# Patient Record
Sex: MALE | Race: WHITE | NOT HISPANIC OR LATINO | URBAN - METROPOLITAN AREA
[De-identification: names, ages, dates, MRNs, and addresses within clinical notes are randomized per-mention and may not be internally consistent; named-entity substitution may affect disease eponyms.]

---

## 2018-09-27 ENCOUNTER — INPATIENT HOSPITAL (AMBULATORY)
Dept: URBAN - METROPOLITAN AREA HOSPITAL 84 | Facility: HOSPITAL | Age: 73
End: 2018-09-27

## 2018-09-27 DIAGNOSIS — K59.00 CONSTIPATION, UNSPECIFIED: ICD-10-CM

## 2018-09-27 DIAGNOSIS — R14.0 ABDOMINAL DISTENSION (GASEOUS): ICD-10-CM

## 2018-09-27 DIAGNOSIS — D61.818 OTHER PANCYTOPENIA: ICD-10-CM

## 2018-09-27 DIAGNOSIS — D51.9 VITAMIN B12 DEFICIENCY ANEMIA, UNSPECIFIED: ICD-10-CM

## 2018-09-27 PROCEDURE — 99222 1ST HOSP IP/OBS MODERATE 55: CPT | Performed by: NURSE PRACTITIONER

## 2018-09-28 ENCOUNTER — INPATIENT HOSPITAL (AMBULATORY)
Dept: URBAN - METROPOLITAN AREA HOSPITAL 84 | Facility: HOSPITAL | Age: 73
End: 2018-09-28

## 2018-09-28 DIAGNOSIS — I10 ESSENTIAL (PRIMARY) HYPERTENSION: ICD-10-CM

## 2018-09-28 DIAGNOSIS — K59.00 CONSTIPATION, UNSPECIFIED: ICD-10-CM

## 2018-09-28 DIAGNOSIS — D61.818 OTHER PANCYTOPENIA: ICD-10-CM

## 2018-09-28 DIAGNOSIS — D64.9 ANEMIA, UNSPECIFIED: ICD-10-CM

## 2018-09-28 DIAGNOSIS — R14.0 ABDOMINAL DISTENSION (GASEOUS): ICD-10-CM

## 2018-09-28 PROCEDURE — 99231 SBSQ HOSP IP/OBS SF/LOW 25: CPT | Performed by: NURSE PRACTITIONER

## 2018-09-29 ENCOUNTER — INPATIENT HOSPITAL (AMBULATORY)
Dept: URBAN - METROPOLITAN AREA HOSPITAL 84 | Facility: HOSPITAL | Age: 73
End: 2018-09-29

## 2018-09-29 DIAGNOSIS — K29.70 GASTRITIS, UNSPECIFIED, WITHOUT BLEEDING: ICD-10-CM

## 2018-09-29 DIAGNOSIS — D64.9 ANEMIA, UNSPECIFIED: ICD-10-CM

## 2018-09-29 DIAGNOSIS — K31.7 POLYP OF STOMACH AND DUODENUM: ICD-10-CM

## 2018-09-29 PROCEDURE — 43239 EGD BIOPSY SINGLE/MULTIPLE: CPT | Performed by: INTERNAL MEDICINE

## 2018-10-05 ENCOUNTER — HOSPITAL ENCOUNTER (OUTPATIENT)
Dept: ONCOLOGY | Facility: CLINIC | Age: 73
Setting detail: INFUSION SERIES
Discharge: HOME | End: 2018-10-05
Attending: INTERNAL MEDICINE | Admitting: INTERNAL MEDICINE

## 2018-10-05 ENCOUNTER — CLINICAL SUPPORT (OUTPATIENT)
Dept: ONCOLOGY | Facility: HOSPITAL | Age: 73
End: 2018-10-05

## 2018-10-05 NOTE — PROGRESS NOTES
PATIENTS ONCOLOGY RECORD LOCATED IN [facility]      Subjective     Name:  BASILIO ABREU     Date:  10/05/2018  Address:  [address] [place] [address]  Home: [phone]  :  1945 AGE:  73 y.o.        RECORDS OBTAINED:  Patients Oncology Record is located in [facility]

## 2018-10-29 ENCOUNTER — HOSPITAL ENCOUNTER (OUTPATIENT)
Dept: ONCOLOGY | Facility: CLINIC | Age: 73
Setting detail: INFUSION SERIES
Discharge: HOME | End: 2018-10-29
Attending: INTERNAL MEDICINE | Admitting: INTERNAL MEDICINE

## 2018-10-29 ENCOUNTER — CLINICAL SUPPORT (OUTPATIENT)
Dept: ONCOLOGY | Facility: HOSPITAL | Age: 73
End: 2018-10-29

## 2018-10-29 NOTE — PROGRESS NOTES
PATIENTS ONCOLOGY RECORD LOCATED IN [facility]      Subjective     Name:  BASILIO ABREU     Date:  10/29/2018  Address:  [address] [place] [address]  Home: [phone]  :  1945 AGE:  73 y.o.        RECORDS OBTAINED:  Patients Oncology Record is located in [facility]

## 2018-11-06 ENCOUNTER — HOSPITAL ENCOUNTER (OUTPATIENT)
Dept: CARDIOLOGY | Facility: HOSPITAL | Age: 73
Discharge: HOME | End: 2018-11-06
Attending: INTERNAL MEDICINE | Admitting: INTERNAL MEDICINE

## 2018-11-15 ENCOUNTER — HOSPITAL ENCOUNTER (OUTPATIENT)
Dept: ONCOLOGY | Facility: CLINIC | Age: 73
Setting detail: INFUSION SERIES
Discharge: HOME | End: 2018-11-15
Attending: INTERNAL MEDICINE | Admitting: INTERNAL MEDICINE

## 2018-11-15 ENCOUNTER — CLINICAL SUPPORT (OUTPATIENT)
Dept: ONCOLOGY | Facility: HOSPITAL | Age: 73
End: 2018-11-15

## 2018-11-15 NOTE — PROGRESS NOTES
PATIENTS ONCOLOGY RECORD LOCATED IN [facility]      Subjective     Name:  BASILIO ABREU     Date:  11/15/2018  Address:  [address] [place] [address]  Home: [unfilled]  :  1945 AGE:  73 y.o.        RECORDS OBTAINED:  Patients Oncology Record is located in [facility]

## 2018-11-29 ENCOUNTER — CLINICAL SUPPORT (OUTPATIENT)
Dept: ONCOLOGY | Facility: HOSPITAL | Age: 73
End: 2018-11-29

## 2018-11-29 ENCOUNTER — HOSPITAL ENCOUNTER (OUTPATIENT)
Dept: ONCOLOGY | Facility: CLINIC | Age: 73
Setting detail: INFUSION SERIES
Discharge: HOME | End: 2018-11-29
Attending: INTERNAL MEDICINE | Admitting: INTERNAL MEDICINE

## 2018-11-29 NOTE — PROGRESS NOTES
PATIENTS ONCOLOGY RECORD LOCATED IN [facility]      Subjective     Name:  BASILIO ABREU     Date:  2018  Address:  [address] [place] [address]  Home: [unfilled]  :  1945 AGE:  73 y.o.        RECORDS OBTAINED:  Patients Oncology Record is located in [facility]

## 2018-12-27 ENCOUNTER — HOSPITAL ENCOUNTER (OUTPATIENT)
Dept: ONCOLOGY | Facility: CLINIC | Age: 73
Setting detail: INFUSION SERIES
Discharge: HOME | End: 2018-12-27
Attending: INTERNAL MEDICINE | Admitting: INTERNAL MEDICINE

## 2018-12-27 ENCOUNTER — CLINICAL SUPPORT (OUTPATIENT)
Dept: ONCOLOGY | Facility: HOSPITAL | Age: 73
End: 2018-12-27

## 2018-12-27 NOTE — PROGRESS NOTES
PATIENTS ONCOLOGY RECORD LOCATED IN [facility]      Subjective     Name:  BASILIO ABREU     Date:  2018  Address:  [address] [place] [address]  Home: [unfilled]  :  1945 AGE:  73 y.o.        RECORDS OBTAINED:  Patients Oncology Record is located in [facility]

## 2019-01-16 ENCOUNTER — HOSPITAL ENCOUNTER (OUTPATIENT)
Dept: LAB | Facility: HOSPITAL | Age: 74
Discharge: HOME | End: 2019-01-16
Attending: INTERNAL MEDICINE | Admitting: INTERNAL MEDICINE

## 2019-01-16 LAB
ANION GAP SERPL CALC-SCNC: 12.1 MMOL/L (ref 10–20)
BUN SERPL-MCNC: 19 MG/DL (ref 8–20)
BUN/CREAT SERPL: 19 (ref 6.2–20.3)
CALCIUM SERPL-MCNC: 9 MG/DL (ref 8.9–10.3)
CHLORIDE SERPL-SCNC: 106 MMOL/L (ref 101–111)
CONV CO2: 24 MMOL/L (ref 22–32)
CREAT UR-MCNC: 1 MG/DL (ref 0.7–1.2)
GLUCOSE SERPL-MCNC: 196 MG/DL (ref 65–99)
POTASSIUM SERPL-SCNC: 4.1 MMOL/L (ref 3.6–5.1)
SODIUM SERPL-SCNC: 138 MMOL/L (ref 136–144)

## 2019-01-31 ENCOUNTER — HOSPITAL ENCOUNTER (OUTPATIENT)
Dept: ONCOLOGY | Facility: CLINIC | Age: 74
Setting detail: INFUSION SERIES
Discharge: HOME | End: 2019-01-31
Attending: INTERNAL MEDICINE | Admitting: INTERNAL MEDICINE

## 2019-01-31 ENCOUNTER — CLINICAL SUPPORT (OUTPATIENT)
Dept: ONCOLOGY | Facility: HOSPITAL | Age: 74
End: 2019-01-31

## 2019-01-31 NOTE — PROGRESS NOTES
PATIENTS ONCOLOGY RECORD LOCATED IN [facility]      Subjective     Name:  BASILIO ABREU     Date:  2019  Address:  [address] [place] [address]  Home: [unfilled]  :  1945 AGE:  74 y.o.        RECORDS OBTAINED:  Patients Oncology Record is located in [facility]

## 2019-02-28 ENCOUNTER — HOSPITAL ENCOUNTER (OUTPATIENT)
Dept: ONCOLOGY | Facility: CLINIC | Age: 74
Setting detail: INFUSION SERIES
Discharge: HOME | End: 2019-02-28
Attending: INTERNAL MEDICINE | Admitting: INTERNAL MEDICINE

## 2019-02-28 ENCOUNTER — HOSPITAL ENCOUNTER (OUTPATIENT)
Dept: ONCOLOGY | Facility: HOSPITAL | Age: 74
Discharge: HOME | End: 2019-02-28

## 2019-03-28 ENCOUNTER — CLINICAL SUPPORT (OUTPATIENT)
Dept: ONCOLOGY | Facility: HOSPITAL | Age: 74
End: 2019-03-28

## 2019-03-28 ENCOUNTER — HOSPITAL ENCOUNTER (OUTPATIENT)
Dept: ONCOLOGY | Facility: CLINIC | Age: 74
Setting detail: INFUSION SERIES
Discharge: HOME | End: 2019-03-28
Attending: INTERNAL MEDICINE | Admitting: INTERNAL MEDICINE

## 2019-03-28 NOTE — PROGRESS NOTES
PATIENTS ONCOLOGY RECORD LOCATED IN [facility]      Subjective     Name:  BASILIO ABREU     Date:  2019  Address:  [address] [place] [address]  Home: [unfilled]  :  1945 AGE:  74 y.o.        RECORDS OBTAINED:  Patients Oncology Record is located in [facility]

## 2019-04-01 ENCOUNTER — HOSPITAL ENCOUNTER (OUTPATIENT)
Dept: OTHER | Facility: HOSPITAL | Age: 74
Discharge: HOME | End: 2019-04-01
Attending: UROLOGY | Admitting: UROLOGY

## 2019-04-01 LAB
ANION GAP SERPL CALC-SCNC: 16.4 MMOL/L (ref 10–20)
BASOPHILS # BLD AUTO: 0 10*3/UL (ref 0–0.2)
BASOPHILS NFR BLD AUTO: 0 % (ref 0–2)
BUN SERPL-MCNC: 13 MG/DL (ref 8–20)
BUN/CREAT SERPL: 13 (ref 6.2–20.3)
CALCIUM SERPL-MCNC: 8.8 MG/DL (ref 8.9–10.3)
CHLORIDE SERPL-SCNC: 103 MMOL/L (ref 101–111)
CONV CO2: 24 MMOL/L (ref 22–32)
CREAT UR-MCNC: 1 MG/DL (ref 0.7–1.2)
DIFFERENTIAL METHOD BLD: (no result)
EOSINOPHIL # BLD AUTO: 0.2 10*3/UL (ref 0–0.3)
EOSINOPHIL # BLD AUTO: 3 % (ref 0–3)
ERYTHROCYTE [DISTWIDTH] IN BLOOD BY AUTOMATED COUNT: 13.7 % (ref 11.5–14.5)
GLUCOSE SERPL-MCNC: 110 MG/DL (ref 65–99)
HCT VFR BLD AUTO: 45.5 % (ref 40–54)
HGB BLD-MCNC: 15.5 G/DL (ref 14–18)
LYMPHOCYTES # BLD AUTO: 2.7 10*3/UL (ref 0.8–4.8)
LYMPHOCYTES NFR BLD AUTO: 29 % (ref 18–42)
MCH RBC QN AUTO: 31.2 PG (ref 26–32)
MCHC RBC AUTO-ENTMCNC: 34.1 G/DL (ref 32–36)
MCV RBC AUTO: 91.5 FL (ref 80–94)
MONOCYTES # BLD AUTO: 0.6 10*3/UL (ref 0.1–1.3)
MONOCYTES NFR BLD AUTO: 6 % (ref 2–11)
NEUTROPHILS # BLD AUTO: 5.9 10*3/UL (ref 2.3–8.6)
NEUTROPHILS NFR BLD AUTO: 62 % (ref 50–75)
NRBC BLD AUTO-RTO: 0 /100{WBCS}
NRBC/RBC NFR BLD MANUAL: 0 10*3/UL
PLATELET # BLD AUTO: 309 10*3/UL (ref 150–450)
PMV BLD AUTO: 6.7 FL (ref 7.4–10.4)
POTASSIUM SERPL-SCNC: 4.4 MMOL/L (ref 3.6–5.1)
RBC # BLD AUTO: 4.97 10*6/UL (ref 4.6–6)
SODIUM SERPL-SCNC: 139 MMOL/L (ref 136–144)
WBC # BLD AUTO: 9.4 10*3/UL (ref 4.5–11.5)

## 2019-04-25 ENCOUNTER — HOSPITAL ENCOUNTER (OUTPATIENT)
Dept: ONCOLOGY | Facility: CLINIC | Age: 74
Setting detail: INFUSION SERIES
Discharge: HOME | End: 2019-04-25
Attending: INTERNAL MEDICINE | Admitting: INTERNAL MEDICINE

## 2019-04-25 ENCOUNTER — CLINICAL SUPPORT (OUTPATIENT)
Dept: ONCOLOGY | Facility: HOSPITAL | Age: 74
End: 2019-04-25

## 2019-04-25 NOTE — PROGRESS NOTES
PATIENTS ONCOLOGY RECORD LOCATED IN [facility]      Subjective     Name:  BASILIO ABREU     Date:  2019  Address:  [address] [place] [address]  Home: [unfilled]  :  1945 AGE:  74 y.o.        RECORDS OBTAINED:  Patients Oncology Record is located in [facility]

## 2019-05-21 VITALS — HEIGHT: 68 IN | WEIGHT: 196.6 LBS | BODY MASS INDEX: 29.8 KG/M2

## 2019-05-23 ENCOUNTER — HOSPITAL ENCOUNTER (OUTPATIENT)
Dept: ONCOLOGY | Facility: CLINIC | Age: 74
Setting detail: INFUSION SERIES
Discharge: HOME | End: 2019-05-23
Attending: INTERNAL MEDICINE | Admitting: INTERNAL MEDICINE

## 2019-05-23 ENCOUNTER — CLINICAL SUPPORT (OUTPATIENT)
Dept: ONCOLOGY | Facility: HOSPITAL | Age: 74
End: 2019-05-23

## 2019-05-23 NOTE — PROGRESS NOTES
PATIENTS ONCOLOGY RECORD LOCATED IN [facility]      Subjective     Name:  BASILIO ABREU     Date:  2019  Address:  [address] [place] [address]  Home: [unfilled]  :  1945 AGE:  74 y.o.        RECORDS OBTAINED:  Patients Oncology Record is located in [facility]

## 2019-06-11 DIAGNOSIS — E53.8 B12 DEFICIENCY: ICD-10-CM

## 2019-06-11 RX ORDER — CYANOCOBALAMIN 1000 UG/ML
1000 INJECTION, SOLUTION INTRAMUSCULAR; SUBCUTANEOUS
Status: CANCELLED | OUTPATIENT
Start: 2019-06-20

## 2019-06-20 ENCOUNTER — HOSPITAL ENCOUNTER (OUTPATIENT)
Dept: ONCOLOGY | Facility: HOSPITAL | Age: 74
Discharge: HOME | End: 2019-06-20
Admitting: NURSE PRACTITIONER

## 2019-06-20 VITALS
HEART RATE: 61 BPM | BODY MASS INDEX: 30.4 KG/M2 | TEMPERATURE: 98 F | HEIGHT: 68 IN | SYSTOLIC BLOOD PRESSURE: 130 MMHG | DIASTOLIC BLOOD PRESSURE: 70 MMHG | WEIGHT: 200.6 LBS | RESPIRATION RATE: 18 BRPM

## 2019-06-20 DIAGNOSIS — E53.8 B12 DEFICIENCY: Primary | ICD-10-CM

## 2019-06-20 PROCEDURE — 96372 THER/PROPH/DIAG INJ SC/IM: CPT

## 2019-06-20 PROCEDURE — 25010000002 CYANOCOBALAMIN PER 1000 MCG: Performed by: NURSE PRACTITIONER

## 2019-06-20 RX ORDER — CYANOCOBALAMIN 1000 UG/ML
1000 INJECTION, SOLUTION INTRAMUSCULAR; SUBCUTANEOUS
Status: DISCONTINUED | OUTPATIENT
Start: 2019-06-20 | End: 2019-06-21 | Stop reason: HOSPADM

## 2019-06-20 RX ADMIN — CYANOCOBALAMIN 1000 MCG: 1000 INJECTION, SOLUTION INTRAMUSCULAR; SUBCUTANEOUS at 10:49

## 2019-07-17 DIAGNOSIS — E53.8 B12 DEFICIENCY: ICD-10-CM

## 2019-07-17 RX ORDER — CYANOCOBALAMIN 1000 UG/ML
1000 INJECTION, SOLUTION INTRAMUSCULAR; SUBCUTANEOUS
Status: CANCELLED | OUTPATIENT
Start: 2019-08-22

## 2019-07-17 RX ORDER — CYANOCOBALAMIN 1000 UG/ML
1000 INJECTION, SOLUTION INTRAMUSCULAR; SUBCUTANEOUS
Status: CANCELLED | OUTPATIENT
Start: 2019-09-26

## 2019-07-17 RX ORDER — CYANOCOBALAMIN 1000 UG/ML
1000 INJECTION, SOLUTION INTRAMUSCULAR; SUBCUTANEOUS
Status: CANCELLED | OUTPATIENT
Start: 2019-10-24

## 2019-07-17 RX ORDER — CYANOCOBALAMIN 1000 UG/ML
1000 INJECTION, SOLUTION INTRAMUSCULAR; SUBCUTANEOUS
Status: CANCELLED | OUTPATIENT
Start: 2019-11-21

## 2019-07-17 RX ORDER — CYANOCOBALAMIN 1000 UG/ML
1000 INJECTION, SOLUTION INTRAMUSCULAR; SUBCUTANEOUS
Status: CANCELLED | OUTPATIENT
Start: 2019-07-18

## 2019-07-18 ENCOUNTER — HOSPITAL ENCOUNTER (OUTPATIENT)
Dept: ONCOLOGY | Facility: HOSPITAL | Age: 74
Discharge: HOME | End: 2019-07-18
Admitting: NURSE PRACTITIONER

## 2019-07-18 VITALS
TEMPERATURE: 97.6 F | HEART RATE: 61 BPM | HEIGHT: 68 IN | DIASTOLIC BLOOD PRESSURE: 80 MMHG | SYSTOLIC BLOOD PRESSURE: 142 MMHG | WEIGHT: 198.6 LBS | BODY MASS INDEX: 30.1 KG/M2

## 2019-07-18 DIAGNOSIS — E53.8 B12 DEFICIENCY: Primary | ICD-10-CM

## 2019-07-18 PROCEDURE — 96372 THER/PROPH/DIAG INJ SC/IM: CPT

## 2019-07-18 PROCEDURE — 25010000002 CYANOCOBALAMIN PER 1000 MCG: Performed by: NURSE PRACTITIONER

## 2019-07-18 RX ORDER — CYANOCOBALAMIN 1000 UG/ML
1000 INJECTION, SOLUTION INTRAMUSCULAR; SUBCUTANEOUS
Status: DISCONTINUED | OUTPATIENT
Start: 2019-07-18 | End: 2019-07-19 | Stop reason: HOSPADM

## 2019-07-18 RX ADMIN — CYANOCOBALAMIN 1000 MCG: 1000 INJECTION, SOLUTION INTRAMUSCULAR; SUBCUTANEOUS at 10:30

## 2019-08-22 ENCOUNTER — HOSPITAL ENCOUNTER (OUTPATIENT)
Dept: ONCOLOGY | Facility: HOSPITAL | Age: 74
Discharge: HOME | End: 2019-08-22
Admitting: NURSE PRACTITIONER

## 2019-08-22 VITALS — RESPIRATION RATE: 18 BRPM | TEMPERATURE: 97.5 F | WEIGHT: 199 LBS | BODY MASS INDEX: 30.16 KG/M2 | HEIGHT: 68 IN

## 2019-08-22 DIAGNOSIS — E53.8 B12 DEFICIENCY: Primary | ICD-10-CM

## 2019-08-22 PROCEDURE — 96372 THER/PROPH/DIAG INJ SC/IM: CPT | Performed by: INTERNAL MEDICINE

## 2019-08-22 PROCEDURE — 25010000002 CYANOCOBALAMIN PER 1000 MCG: Performed by: INTERNAL MEDICINE

## 2019-08-22 RX ORDER — CYANOCOBALAMIN 1000 UG/ML
1000 INJECTION, SOLUTION INTRAMUSCULAR; SUBCUTANEOUS
Status: DISCONTINUED | OUTPATIENT
Start: 2019-08-22 | End: 2019-08-23 | Stop reason: HOSPADM

## 2019-08-22 RX ADMIN — CYANOCOBALAMIN 1000 MCG: 1000 INJECTION, SOLUTION INTRAMUSCULAR; SUBCUTANEOUS at 11:33

## 2019-09-26 ENCOUNTER — APPOINTMENT (OUTPATIENT)
Dept: LAB | Facility: HOSPITAL | Age: 74
End: 2019-09-26

## 2019-09-26 ENCOUNTER — HOSPITAL ENCOUNTER (OUTPATIENT)
Dept: ONCOLOGY | Facility: HOSPITAL | Age: 74
Discharge: HOME | End: 2019-09-26
Admitting: NURSE PRACTITIONER

## 2019-09-26 ENCOUNTER — OFFICE VISIT (OUTPATIENT)
Dept: ONCOLOGY | Facility: CLINIC | Age: 74
End: 2019-09-26

## 2019-09-26 VITALS
BODY MASS INDEX: 30.4 KG/M2 | TEMPERATURE: 97.6 F | SYSTOLIC BLOOD PRESSURE: 177 MMHG | HEART RATE: 69 BPM | WEIGHT: 200.6 LBS | RESPIRATION RATE: 20 BRPM | DIASTOLIC BLOOD PRESSURE: 89 MMHG | HEIGHT: 68 IN

## 2019-09-26 DIAGNOSIS — E53.8 B12 DEFICIENCY: Primary | ICD-10-CM

## 2019-09-26 LAB
BASOPHILS # BLD AUTO: 0.01 10*3/MM3 (ref 0–0.2)
BASOPHILS NFR BLD AUTO: 0.1 % (ref 0–1.5)
DEPRECATED RDW RBC AUTO: 41.4 FL (ref 37–54)
EOSINOPHIL # BLD AUTO: 0.14 10*3/MM3 (ref 0–0.4)
EOSINOPHIL NFR BLD AUTO: 2 % (ref 0.3–6.2)
ERYTHROCYTE [DISTWIDTH] IN BLOOD BY AUTOMATED COUNT: 12.9 % (ref 12.3–15.4)
HCT VFR BLD AUTO: 45.2 % (ref 37.5–51)
HGB BLD-MCNC: 15.6 G/DL (ref 13–17.7)
LYMPHOCYTES # BLD AUTO: 1.86 10*3/MM3 (ref 0.7–3.1)
LYMPHOCYTES NFR BLD AUTO: 26.3 % (ref 19.6–45.3)
MCH RBC QN AUTO: 31.1 PG (ref 26.6–33)
MCHC RBC AUTO-ENTMCNC: 34.5 G/DL (ref 31.5–35.7)
MCV RBC AUTO: 90 FL (ref 79–97)
MONOCYTES # BLD AUTO: 0.67 10*3/MM3 (ref 0.1–0.9)
MONOCYTES NFR BLD AUTO: 9.5 % (ref 5–12)
NEUTROPHILS # BLD AUTO: 4.4 10*3/MM3 (ref 1.7–7)
NEUTROPHILS NFR BLD AUTO: 62.1 % (ref 42.7–76)
PLATELET # BLD AUTO: 188 10*3/MM3 (ref 140–450)
PMV BLD AUTO: 8.4 FL (ref 6–12)
RBC # BLD AUTO: 5.02 10*6/MM3 (ref 4.14–5.8)
WBC NRBC COR # BLD: 7.08 10*3/MM3 (ref 3.4–10.8)

## 2019-09-26 PROCEDURE — 85025 COMPLETE CBC W/AUTO DIFF WBC: CPT | Performed by: INTERNAL MEDICINE

## 2019-09-26 PROCEDURE — 25010000002 CYANOCOBALAMIN PER 1000 MCG: Performed by: INTERNAL MEDICINE

## 2019-09-26 PROCEDURE — 99214 OFFICE O/P EST MOD 30 MIN: CPT | Performed by: INTERNAL MEDICINE

## 2019-09-26 PROCEDURE — 96372 THER/PROPH/DIAG INJ SC/IM: CPT | Performed by: INTERNAL MEDICINE

## 2019-09-26 RX ORDER — CYANOCOBALAMIN 1000 UG/ML
1000 INJECTION, SOLUTION INTRAMUSCULAR; SUBCUTANEOUS
Status: DISCONTINUED | OUTPATIENT
Start: 2019-09-26 | End: 2019-09-27 | Stop reason: HOSPADM

## 2019-09-26 RX ORDER — ASPIRIN 81 MG/1
TABLET ORAL EVERY 24 HOURS
COMMUNITY
Start: 2018-10-31

## 2019-09-26 RX ORDER — METOPROLOL SUCCINATE 50 MG/1
TABLET, EXTENDED RELEASE ORAL EVERY 24 HOURS
COMMUNITY
Start: 2018-10-31

## 2019-09-26 RX ORDER — LISINOPRIL 20 MG/1
TABLET ORAL EVERY 24 HOURS
COMMUNITY
Start: 2018-10-31

## 2019-09-26 RX ORDER — AMLODIPINE BESYLATE 5 MG/1
TABLET ORAL EVERY 24 HOURS
COMMUNITY
Start: 2019-05-01 | End: 2020-05-05

## 2019-09-26 RX ADMIN — CYANOCOBALAMIN 1000 MCG: 1000 INJECTION, SOLUTION INTRAMUSCULAR; SUBCUTANEOUS at 11:55

## 2019-09-26 NOTE — PROGRESS NOTES
Hematology/Oncology Outpatient Follow Up    PATIENT NAME:Edgardo Jacobson  :1945  MRN: [id number]  PRIMARY CARE PHYSICIAN: Maru Ramirez DO  REFERRING PHYSICIAN: Maru Ramirez DO    Chief Complaint   Patient presents with   • Follow-up     anemia   • Follow-up     b12 deficiency        HISTORY OF PRESENT ILLNESS:   This is a 74-year-old male who was a direct admit from PCP office for severe anemia.  He reported feeling poorly with worsening dyspnea and fatigue over the past six months.  His hemoglobin was found to be 7.5 at the PCP visit, previously had been over 14.  Patient denied any signs of bleeding.  Reported abdominal bloating without melena.                  Heme/Onc was consulted for macrocytic anemia on 18.  His CBC showed WBC of 3.7, hemoglobin 7.1,  and platelet count 136,000.  Vitamin B12 was noted to be low at less than 50.  Reticulocyte was 0.74.  Patient received a total of three units of packed red blood cells in the hospital and was started on replacement therapy for B12.  He denied any recent infections or contacts with the ill, but he did complain of abdominal bloating with constipation and rectal bleeding.  Patient had CT scan of the abdomen and pelvis on 18 which was essentially unremarkable, except for diverticulosis.  Patient was seen by GI and had EGD on 18.  This showed a normal esophagus, atrophic gastritis with retained food in the stomach from breakfast six hours earlier.  Normal duodenum.  Review of his labs at the time of discharge on 10/1/18, WBC was 4.7, hemoglobin 9.7, , platelet count 135,000.  Differentials were 53% neutrophils, 23% lymphocytes.  There was no monocytosis, eosinophilia or basophilia.  Chemistry panel:  BUN 11, creatinine 1.1.  Ferritin was 213.  Iron binding capacity was low at 225.  TSH normal at 3.2.  Haptoglobin low at 28.  .  Folate was more than 24.  Hemoccult stool was negative.    • 2018 - B12 injections  started.      Past Medical History:   Diagnosis Date   • B12 deficiency    • BPH (benign prostatic hyperplasia)    • Hypertension        Past Surgical History:   Procedure Laterality Date   • COLONOSCOPY           Current Outpatient Medications:   •  amLODIPine (NORVASC) 5 MG tablet, Daily., Disp: , Rfl:   •  aspirin (ASPIR-LOW) 81 MG EC tablet, Daily., Disp: , Rfl:   •  Cyanocobalamin (B-12 COMPLIANCE INJECTION) 1000 MCG/ML kit, B-12 COMPLIANCE INJECTION 1000 MCG/ML KIT, Disp: , Rfl:   •  lisinopril (PRINIVIL,ZESTRIL) 20 MG tablet, Daily., Disp: , Rfl:   •  metoprolol succinate XL (TOPROL-XL) 50 MG 24 hr tablet, Daily., Disp: , Rfl:     No Known Allergies    No family history on file.    Cancer-related family history is not on file.    Social History     Tobacco Use   • Smoking status: Never Smoker   • Smokeless tobacco: Never Used   Substance Use Topics   • Alcohol use: No     Frequency: Never   • Drug use: No       I have reviewed the history of present illness, past medical history, family history, social history, lab results, all notes and other records since the patient was last seen on Visit date not found.    SUBJECTIVE:  The patient is here for a follow up appointment.  He states that he continues to remain active.  He denies any new complaints today.           REVIEW OF SYSTEMS:  Review of Systems   Constitutional: Negative for chills and fever.   HENT: Negative for ear pain, mouth sores, nosebleeds and sore throat.    Eyes: Negative for photophobia and visual disturbance.   Respiratory: Negative for wheezing and stridor.    Cardiovascular: Negative for chest pain and palpitations.   Gastrointestinal: Negative for abdominal pain, diarrhea, nausea and vomiting.   Endocrine: Negative for cold intolerance and heat intolerance.   Genitourinary: Negative for dysuria and hematuria.   Musculoskeletal: Negative for joint swelling and neck stiffness.   Skin: Negative for color change and rash.   Neurological:  "Negative for seizures and syncope.   Hematological: Negative for adenopathy.        No obvious bleeding   Psychiatric/Behavioral: Negative for agitation, confusion and hallucinations.       OBJECTIVE:    Vitals:    09/26/19 1018   BP: 177/89   Pulse: 69   Resp: 20   Temp: 97.6 °F (36.4 °C)   Weight: 91 kg (200 lb 9.6 oz)   Height: 172.7 cm (68\")   PainSc: 0-No pain       ECOG  (0) Fully active, able to carry on all predisease performance without restriction    Physical Exam   Constitutional: He is oriented to person, place, and time. No distress.   HENT:   Head: Normocephalic and atraumatic.   Eyes: Conjunctivae and EOM are normal. Right eye exhibits no discharge. Left eye exhibits no discharge. No scleral icterus.   Neck: Normal range of motion. Neck supple. No thyromegaly present.   Cardiovascular: Normal rate, regular rhythm and normal heart sounds. Exam reveals no gallop and no friction rub.   Pulmonary/Chest: Effort normal. No stridor. No respiratory distress. He has no wheezes.   Abdominal: Soft. Bowel sounds are normal. He exhibits no mass. There is no tenderness. There is no rebound and no guarding.   Musculoskeletal: Normal range of motion. He exhibits no tenderness.   Lymphadenopathy:     He has no cervical adenopathy.   Neurological: He is alert and oriented to person, place, and time. He exhibits normal muscle tone.   Skin: Skin is warm. No rash noted. He is not diaphoretic. No erythema.   Psychiatric: He has a normal mood and affect. His behavior is normal.   Nursing note and vitals reviewed.      RECENT LABS  WBC   Date Value Ref Range Status   09/26/2019 7.08 3.40 - 10.80 10*3/mm3 Final     RBC   Date Value Ref Range Status   09/26/2019 5.02 4.14 - 5.80 10*6/mm3 Final     Hemoglobin   Date Value Ref Range Status   09/26/2019 15.6 13.0 - 17.7 g/dL Final     Hematocrit   Date Value Ref Range Status   09/26/2019 45.2 37.5 - 51.0 % Final     MCV   Date Value Ref Range Status   09/26/2019 90.0 79.0 - 97.0 " fL Final     MCH   Date Value Ref Range Status   09/26/2019 31.1 26.6 - 33.0 pg Final     MCHC   Date Value Ref Range Status   09/26/2019 34.5 31.5 - 35.7 g/dL Final     RDW   Date Value Ref Range Status   09/26/2019 12.9 12.3 - 15.4 % Final     RDW-SD   Date Value Ref Range Status   09/26/2019 41.4 37.0 - 54.0 fl Final     MPV   Date Value Ref Range Status   09/26/2019 8.4 6.0 - 12.0 fL Final     Platelets   Date Value Ref Range Status   09/26/2019 188 140 - 450 10*3/mm3 Final     Neutrophil %   Date Value Ref Range Status   09/26/2019 62.1 42.7 - 76.0 % Final     Lymphocyte %   Date Value Ref Range Status   09/26/2019 26.3 19.6 - 45.3 % Final     Monocyte %   Date Value Ref Range Status   09/26/2019 9.5 5.0 - 12.0 % Final     Eosinophil %   Date Value Ref Range Status   09/26/2019 2.0 0.3 - 6.2 % Final     Basophil %   Date Value Ref Range Status   09/26/2019 0.1 0.0 - 1.5 % Final     Neutrophils, Absolute   Date Value Ref Range Status   09/26/2019 4.40 1.70 - 7.00 10*3/mm3 Final     Lymphocytes, Absolute   Date Value Ref Range Status   09/26/2019 1.86 0.70 - 3.10 10*3/mm3 Final     Monocytes, Absolute   Date Value Ref Range Status   09/26/2019 0.67 0.10 - 0.90 10*3/mm3 Final     Eosinophils, Absolute   Date Value Ref Range Status   09/26/2019 0.14 0.00 - 0.40 10*3/mm3 Final     Basophils, Absolute   Date Value Ref Range Status   09/26/2019 0.01 0.00 - 0.20 10*3/mm3 Final     nRBC   Date Value Ref Range Status   04/01/2019 0 0 /100[WBCs] Final       Lab Results   Component Value Date    GLUCOSE 110 (H) 04/01/2019    BUN 13 04/01/2019    CREATININE 1.0 04/01/2019    BCR 13.0 04/01/2019    K 4.4 04/01/2019    CO2 24 04/01/2019    CALCIUM 8.8 (L) 04/01/2019    ALBUMIN 3.1 (L) 09/29/2018    LABIL2 1.8 (H) 09/29/2018    AST 23 09/29/2018    ALT 15 (L) 09/29/2018         Assessment/Plan     B12 deficiency  - CBC & Differential  - CBC Auto Differential      ASSESSMENT:    1. Severe B12 deficiency, on B12 replacement  therapy.     2. History of heavy alcohol use.  3. Hypertension.  4. BPH.  5. Anemia    PLANS:     Patient will continue B12 injections.  We discussed signs and symptoms for him to monitor for and call should they are call.  And denies GI bleed, nausea, vomiting or changes to his bowel habits.  He also denies abdominal pain.  His energy level remains good.  I will see him again in six months.  He will follow up with the rest of his physicians for his ongoing medical needs.            I have reviewed labs results, imaging, vitals, and medications with the patient today. Will follow up in 6 months with me.          Patient verbalized understanding and is in agreement of the above plan.    Part of this document was scribed by Alexandra Reza RN, BSN.        Much of the above report is an electronic transcription/translation of the spoken language to printed text using Dragon Software. As such, the subtleties and finesse of the spoken language may permit erroneous, or at times, nonsensical words or phrases to be inadvertently transcribed; thus changes may be made at a later date to rectify these errors.       no

## 2019-10-24 ENCOUNTER — HOSPITAL ENCOUNTER (OUTPATIENT)
Dept: ONCOLOGY | Facility: HOSPITAL | Age: 74
Discharge: HOME | End: 2019-10-24
Admitting: NURSE PRACTITIONER

## 2019-10-24 VITALS
WEIGHT: 200 LBS | RESPIRATION RATE: 18 BRPM | TEMPERATURE: 97.7 F | BODY MASS INDEX: 30.31 KG/M2 | DIASTOLIC BLOOD PRESSURE: 80 MMHG | HEIGHT: 68 IN | HEART RATE: 82 BPM | SYSTOLIC BLOOD PRESSURE: 130 MMHG

## 2019-10-24 DIAGNOSIS — E53.8 B12 DEFICIENCY: Primary | ICD-10-CM

## 2019-10-24 PROCEDURE — 25010000002 CYANOCOBALAMIN PER 1000 MCG: Performed by: INTERNAL MEDICINE

## 2019-10-24 PROCEDURE — 96372 THER/PROPH/DIAG INJ SC/IM: CPT | Performed by: INTERNAL MEDICINE

## 2019-10-24 RX ORDER — CYANOCOBALAMIN 1000 UG/ML
1000 INJECTION, SOLUTION INTRAMUSCULAR; SUBCUTANEOUS
Status: DISCONTINUED | OUTPATIENT
Start: 2019-10-24 | End: 2019-10-25 | Stop reason: HOSPADM

## 2019-10-24 RX ADMIN — CYANOCOBALAMIN 1000 MCG: 1000 INJECTION, SOLUTION INTRAMUSCULAR; SUBCUTANEOUS at 08:36

## 2019-11-21 ENCOUNTER — HOSPITAL ENCOUNTER (OUTPATIENT)
Dept: ONCOLOGY | Facility: HOSPITAL | Age: 74
Discharge: HOME | End: 2019-11-21
Admitting: NURSE PRACTITIONER

## 2019-11-21 VITALS
RESPIRATION RATE: 18 BRPM | SYSTOLIC BLOOD PRESSURE: 128 MMHG | DIASTOLIC BLOOD PRESSURE: 72 MMHG | HEART RATE: 84 BPM | WEIGHT: 203.6 LBS | TEMPERATURE: 97.8 F | BODY MASS INDEX: 30.86 KG/M2 | HEIGHT: 68 IN

## 2019-11-21 DIAGNOSIS — E53.8 B12 DEFICIENCY: Primary | ICD-10-CM

## 2019-11-21 PROCEDURE — 96372 THER/PROPH/DIAG INJ SC/IM: CPT | Performed by: NURSE PRACTITIONER

## 2019-11-21 PROCEDURE — 25010000002 CYANOCOBALAMIN PER 1000 MCG: Performed by: NURSE PRACTITIONER

## 2019-11-21 RX ORDER — CYANOCOBALAMIN 1000 UG/ML
1000 INJECTION, SOLUTION INTRAMUSCULAR; SUBCUTANEOUS
Status: DISCONTINUED | OUTPATIENT
Start: 2019-11-21 | End: 2019-11-22 | Stop reason: HOSPADM

## 2019-11-21 RX ADMIN — CYANOCOBALAMIN 1000 MCG: 1000 INJECTION, SOLUTION INTRAMUSCULAR; SUBCUTANEOUS at 08:41

## 2019-12-26 ENCOUNTER — ON CAMPUS - OUTPATIENT (AMBULATORY)
Dept: URBAN - METROPOLITAN AREA HOSPITAL 2 | Facility: HOSPITAL | Age: 74
End: 2019-12-26

## 2019-12-26 ENCOUNTER — OFFICE (AMBULATORY)
Dept: URBAN - METROPOLITAN AREA PATHOLOGY 4 | Facility: PATHOLOGY | Age: 74
End: 2019-12-26

## 2019-12-26 ENCOUNTER — OFFICE (AMBULATORY)
Dept: URBAN - METROPOLITAN AREA PATHOLOGY 4 | Facility: PATHOLOGY | Age: 74
End: 2019-12-26
Payer: COMMERCIAL

## 2019-12-26 VITALS
SYSTOLIC BLOOD PRESSURE: 103 MMHG | SYSTOLIC BLOOD PRESSURE: 156 MMHG | DIASTOLIC BLOOD PRESSURE: 83 MMHG | HEART RATE: 70 BPM | HEART RATE: 87 BPM | SYSTOLIC BLOOD PRESSURE: 111 MMHG | SYSTOLIC BLOOD PRESSURE: 129 MMHG | OXYGEN SATURATION: 65 % | HEIGHT: 68 IN | RESPIRATION RATE: 18 BRPM | SYSTOLIC BLOOD PRESSURE: 149 MMHG | TEMPERATURE: 98.7 F | DIASTOLIC BLOOD PRESSURE: 97 MMHG | RESPIRATION RATE: 16 BRPM | OXYGEN SATURATION: 98 % | OXYGEN SATURATION: 95 % | HEART RATE: 64 BPM | OXYGEN SATURATION: 94 % | DIASTOLIC BLOOD PRESSURE: 70 MMHG | DIASTOLIC BLOOD PRESSURE: 92 MMHG | OXYGEN SATURATION: 96 % | HEART RATE: 65 BPM | HEART RATE: 74 BPM | WEIGHT: 198.8 LBS | HEART RATE: 88 BPM | SYSTOLIC BLOOD PRESSURE: 102 MMHG | DIASTOLIC BLOOD PRESSURE: 76 MMHG | SYSTOLIC BLOOD PRESSURE: 110 MMHG | DIASTOLIC BLOOD PRESSURE: 64 MMHG

## 2019-12-26 DIAGNOSIS — D12.8 BENIGN NEOPLASM OF RECTUM: ICD-10-CM

## 2019-12-26 DIAGNOSIS — K62.1 RECTAL POLYP: ICD-10-CM

## 2019-12-26 DIAGNOSIS — Z86.010 PERSONAL HISTORY OF COLONIC POLYPS: ICD-10-CM

## 2019-12-26 DIAGNOSIS — K64.1 SECOND DEGREE HEMORRHOIDS: ICD-10-CM

## 2019-12-26 LAB
GI HISTOLOGY: A. UNSPECIFIED: (no result)
GI HISTOLOGY: PDF REPORT: (no result)

## 2019-12-26 PROCEDURE — 88305 TISSUE EXAM BY PATHOLOGIST: CPT | Performed by: INTERNAL MEDICINE

## 2019-12-26 PROCEDURE — 88305 TISSUE EXAM BY PATHOLOGIST: CPT | Mod: 26 | Performed by: INTERNAL MEDICINE

## 2019-12-26 PROCEDURE — 45385 COLONOSCOPY W/LESION REMOVAL: CPT | Mod: PT | Performed by: INTERNAL MEDICINE

## 2019-12-26 RX ORDER — CYANOCOBALAMIN 1000 UG/ML
1000 INJECTION, SOLUTION INTRAMUSCULAR; SUBCUTANEOUS
Status: CANCELLED | OUTPATIENT
Start: 2019-12-30

## 2019-12-27 ENCOUNTER — LAB REQUISITION (OUTPATIENT)
Dept: LAB | Facility: HOSPITAL | Age: 74
End: 2019-12-27

## 2019-12-27 DIAGNOSIS — Z86.010 PERSONAL HISTORY OF COLONIC POLYPS: ICD-10-CM

## 2019-12-30 ENCOUNTER — HOSPITAL ENCOUNTER (OUTPATIENT)
Dept: ONCOLOGY | Facility: HOSPITAL | Age: 74
Discharge: HOME | End: 2019-12-30
Admitting: INTERNAL MEDICINE

## 2019-12-30 VITALS
HEART RATE: 66 BPM | WEIGHT: 205 LBS | SYSTOLIC BLOOD PRESSURE: 185 MMHG | TEMPERATURE: 98.1 F | DIASTOLIC BLOOD PRESSURE: 90 MMHG | BODY MASS INDEX: 31.07 KG/M2 | HEIGHT: 68 IN | RESPIRATION RATE: 18 BRPM

## 2019-12-30 DIAGNOSIS — E53.8 B12 DEFICIENCY: Primary | ICD-10-CM

## 2019-12-30 LAB
LAB AP CASE REPORT: NORMAL
PATH REPORT.FINAL DX SPEC: NORMAL
PATH REPORT.GROSS SPEC: NORMAL

## 2019-12-30 PROCEDURE — 96372 THER/PROPH/DIAG INJ SC/IM: CPT | Performed by: INTERNAL MEDICINE

## 2019-12-30 PROCEDURE — 25010000002 CYANOCOBALAMIN PER 1000 MCG: Performed by: INTERNAL MEDICINE

## 2019-12-30 RX ORDER — CYANOCOBALAMIN 1000 UG/ML
1000 INJECTION, SOLUTION INTRAMUSCULAR; SUBCUTANEOUS
Status: DISCONTINUED | OUTPATIENT
Start: 2019-12-30 | End: 2019-12-31 | Stop reason: HOSPADM

## 2019-12-30 RX ADMIN — CYANOCOBALAMIN 1000 MCG: 1000 INJECTION, SOLUTION INTRAMUSCULAR; SUBCUTANEOUS at 08:18

## 2020-01-27 ENCOUNTER — HOSPITAL ENCOUNTER (OUTPATIENT)
Dept: ONCOLOGY | Facility: HOSPITAL | Age: 75
Discharge: HOME | End: 2020-01-27
Admitting: INTERNAL MEDICINE

## 2020-01-27 VITALS
BODY MASS INDEX: 31.28 KG/M2 | WEIGHT: 206.4 LBS | RESPIRATION RATE: 18 BRPM | TEMPERATURE: 97.4 F | SYSTOLIC BLOOD PRESSURE: 144 MMHG | DIASTOLIC BLOOD PRESSURE: 78 MMHG | HEIGHT: 68 IN | HEART RATE: 82 BPM

## 2020-01-27 DIAGNOSIS — E53.8 B12 DEFICIENCY: Primary | ICD-10-CM

## 2020-01-27 PROCEDURE — 96372 THER/PROPH/DIAG INJ SC/IM: CPT | Performed by: INTERNAL MEDICINE

## 2020-01-27 PROCEDURE — 25010000002 CYANOCOBALAMIN PER 1000 MCG: Performed by: INTERNAL MEDICINE

## 2020-01-27 RX ORDER — CYANOCOBALAMIN 1000 UG/ML
1000 INJECTION, SOLUTION INTRAMUSCULAR; SUBCUTANEOUS
Status: DISCONTINUED | OUTPATIENT
Start: 2020-01-27 | End: 2020-01-28 | Stop reason: HOSPADM

## 2020-01-27 RX ADMIN — CYANOCOBALAMIN 1000 MCG: 1000 INJECTION, SOLUTION INTRAMUSCULAR; SUBCUTANEOUS at 08:33

## 2020-02-24 ENCOUNTER — HOSPITAL ENCOUNTER (OUTPATIENT)
Dept: ONCOLOGY | Facility: HOSPITAL | Age: 75
Discharge: HOME | End: 2020-02-24
Admitting: INTERNAL MEDICINE

## 2020-02-24 VITALS
SYSTOLIC BLOOD PRESSURE: 160 MMHG | RESPIRATION RATE: 18 BRPM | DIASTOLIC BLOOD PRESSURE: 81 MMHG | WEIGHT: 206 LBS | HEART RATE: 81 BPM | TEMPERATURE: 97.4 F | HEIGHT: 68 IN | BODY MASS INDEX: 31.22 KG/M2

## 2020-02-24 DIAGNOSIS — E53.8 B12 DEFICIENCY: Primary | ICD-10-CM

## 2020-02-24 PROCEDURE — 25010000002 CYANOCOBALAMIN PER 1000 MCG: Performed by: INTERNAL MEDICINE

## 2020-02-24 PROCEDURE — 96372 THER/PROPH/DIAG INJ SC/IM: CPT

## 2020-02-24 RX ORDER — CYANOCOBALAMIN 1000 UG/ML
1000 INJECTION, SOLUTION INTRAMUSCULAR; SUBCUTANEOUS
Status: DISCONTINUED | OUTPATIENT
Start: 2020-02-24 | End: 2020-02-25 | Stop reason: HOSPADM

## 2020-02-24 RX ADMIN — CYANOCOBALAMIN 1000 MCG: 1000 INJECTION, SOLUTION INTRAMUSCULAR; SUBCUTANEOUS at 08:18

## 2020-03-26 ENCOUNTER — APPOINTMENT (OUTPATIENT)
Dept: ONCOLOGY | Facility: HOSPITAL | Age: 75
End: 2020-03-26

## 2020-03-26 ENCOUNTER — APPOINTMENT (OUTPATIENT)
Dept: LAB | Facility: HOSPITAL | Age: 75
End: 2020-03-26

## 2020-05-05 RX ORDER — AMLODIPINE BESYLATE 5 MG/1
TABLET ORAL
Qty: 90 TABLET | Refills: 3 | Status: SHIPPED | OUTPATIENT
Start: 2020-05-05 | End: 2020-05-27 | Stop reason: SDUPTHER

## 2020-05-06 DIAGNOSIS — E53.8 B12 DEFICIENCY: Primary | ICD-10-CM

## 2020-05-07 ENCOUNTER — HOSPITAL ENCOUNTER (OUTPATIENT)
Dept: ONCOLOGY | Facility: HOSPITAL | Age: 75
Discharge: HOME | End: 2020-05-07
Admitting: INTERNAL MEDICINE

## 2020-05-07 ENCOUNTER — APPOINTMENT (OUTPATIENT)
Dept: LAB | Facility: HOSPITAL | Age: 75
End: 2020-05-07

## 2020-05-07 VITALS
HEIGHT: 68 IN | BODY MASS INDEX: 31.52 KG/M2 | WEIGHT: 208 LBS | RESPIRATION RATE: 18 BRPM | TEMPERATURE: 97.7 F | HEART RATE: 81 BPM | SYSTOLIC BLOOD PRESSURE: 142 MMHG | DIASTOLIC BLOOD PRESSURE: 82 MMHG

## 2020-05-07 DIAGNOSIS — E53.8 B12 DEFICIENCY: Primary | ICD-10-CM

## 2020-05-07 PROCEDURE — 96372 THER/PROPH/DIAG INJ SC/IM: CPT

## 2020-05-07 PROCEDURE — 25010000002 CYANOCOBALAMIN PER 1000 MCG: Performed by: INTERNAL MEDICINE

## 2020-05-07 RX ORDER — CYANOCOBALAMIN 1000 UG/ML
1000 INJECTION, SOLUTION INTRAMUSCULAR; SUBCUTANEOUS
Status: DISCONTINUED | OUTPATIENT
Start: 2020-05-07 | End: 2020-05-08 | Stop reason: HOSPADM

## 2020-05-07 RX ADMIN — CYANOCOBALAMIN 1000 MCG: 1000 INJECTION INTRAMUSCULAR; SUBCUTANEOUS at 13:55

## 2020-05-27 RX ORDER — AMLODIPINE BESYLATE 5 MG/1
5 TABLET ORAL DAILY
Qty: 30 TABLET | Refills: 0 | Status: SHIPPED | OUTPATIENT
Start: 2020-05-27

## 2020-06-08 ENCOUNTER — APPOINTMENT (OUTPATIENT)
Dept: LAB | Facility: HOSPITAL | Age: 75
End: 2020-06-08

## 2020-06-08 ENCOUNTER — HOSPITAL ENCOUNTER (OUTPATIENT)
Dept: ONCOLOGY | Facility: HOSPITAL | Age: 75
Discharge: HOME | End: 2020-06-08
Admitting: INTERNAL MEDICINE

## 2020-06-08 ENCOUNTER — OFFICE VISIT (OUTPATIENT)
Dept: ONCOLOGY | Facility: CLINIC | Age: 75
End: 2020-06-08

## 2020-06-08 VITALS
DIASTOLIC BLOOD PRESSURE: 70 MMHG | WEIGHT: 212 LBS | HEIGHT: 68 IN | BODY MASS INDEX: 32.13 KG/M2 | SYSTOLIC BLOOD PRESSURE: 175 MMHG | RESPIRATION RATE: 16 BRPM | TEMPERATURE: 98.6 F | HEART RATE: 63 BPM

## 2020-06-08 DIAGNOSIS — E53.8 B12 DEFICIENCY: Primary | ICD-10-CM

## 2020-06-08 LAB
BASOPHILS # BLD AUTO: 0.02 10*3/MM3 (ref 0–0.2)
BASOPHILS NFR BLD AUTO: 0.3 % (ref 0–1.5)
DEPRECATED RDW RBC AUTO: 44.6 FL (ref 37–54)
EOSINOPHIL # BLD AUTO: 0.2 10*3/MM3 (ref 0–0.4)
EOSINOPHIL NFR BLD AUTO: 2.6 % (ref 0.3–6.2)
ERYTHROCYTE [DISTWIDTH] IN BLOOD BY AUTOMATED COUNT: 13.4 % (ref 12.3–15.4)
HCT VFR BLD AUTO: 43.9 % (ref 37.5–51)
HGB BLD-MCNC: 15 G/DL (ref 13–17.7)
LYMPHOCYTES # BLD AUTO: 2.17 10*3/MM3 (ref 0.7–3.1)
LYMPHOCYTES NFR BLD AUTO: 27.7 % (ref 19.6–45.3)
MCH RBC QN AUTO: 31.8 PG (ref 26.6–33)
MCHC RBC AUTO-ENTMCNC: 34.2 G/DL (ref 31.5–35.7)
MCV RBC AUTO: 93 FL (ref 79–97)
MONOCYTES # BLD AUTO: 0.75 10*3/MM3 (ref 0.1–0.9)
MONOCYTES NFR BLD AUTO: 9.6 % (ref 5–12)
NEUTROPHILS # BLD AUTO: 4.68 10*3/MM3 (ref 1.7–7)
NEUTROPHILS NFR BLD AUTO: 59.8 % (ref 42.7–76)
PLATELET # BLD AUTO: 196 10*3/MM3 (ref 140–450)
PMV BLD AUTO: 8.7 FL (ref 6–12)
RBC # BLD AUTO: 4.72 10*6/MM3 (ref 4.14–5.8)
WBC NRBC COR # BLD: 7.82 10*3/MM3 (ref 3.4–10.8)

## 2020-06-08 PROCEDURE — 96372 THER/PROPH/DIAG INJ SC/IM: CPT

## 2020-06-08 PROCEDURE — 99213 OFFICE O/P EST LOW 20 MIN: CPT | Performed by: INTERNAL MEDICINE

## 2020-06-08 PROCEDURE — 25010000002 CYANOCOBALAMIN PER 1000 MCG: Performed by: INTERNAL MEDICINE

## 2020-06-08 PROCEDURE — 85025 COMPLETE CBC W/AUTO DIFF WBC: CPT | Performed by: INTERNAL MEDICINE

## 2020-06-08 RX ORDER — CYANOCOBALAMIN 1000 UG/ML
1000 INJECTION, SOLUTION INTRAMUSCULAR; SUBCUTANEOUS
Status: DISCONTINUED | OUTPATIENT
Start: 2020-06-08 | End: 2020-06-09 | Stop reason: HOSPADM

## 2020-06-08 RX ADMIN — CYANOCOBALAMIN 1000 MCG: 1000 INJECTION INTRAMUSCULAR; SUBCUTANEOUS at 14:59

## 2020-06-08 NOTE — PROGRESS NOTES
Hematology/Oncology Outpatient Follow Up    PATIENT NAME:Edgardo Jacobson  :1945  MRN: [id number]  PRIMARY CARE PHYSICIAN: Maru Ramirez DO  REFERRING PHYSICIAN: Maru Ramirez DO    Chief Complaint   Patient presents with   • Follow-up     b12 deficiency        HISTORY OF PRESENT ILLNESS:   This is a 75-year-old male who was a direct admit from PCP office for severe anemia.  He reported feeling poorly with worsening dyspnea and fatigue over the past six months.  His hemoglobin was found to be 7.5 at the PCP visit, previously had been over 14.  Patient denied any signs of bleeding.  Reported abdominal bloating without melena.                  Heme/Onc was consulted for macrocytic anemia on 18.  His CBC showed WBC of 3.7, hemoglobin 7.1,  and platelet count 136,000.  Vitamin B12 was noted to be low at less than 50.  Reticulocyte was 0.74.  Patient received a total of three units of packed red blood cells in the hospital and was started on replacement therapy for B12.  He denied any recent infections or contacts with the ill, but he did complain of abdominal bloating with constipation and rectal bleeding.  Patient had CT scan of the abdomen and pelvis on 18 which was essentially unremarkable, except for diverticulosis.  Patient was seen by GI and had EGD on 18.  This showed a normal esophagus, atrophic gastritis with retained food in the stomach from breakfast six hours earlier.  Normal duodenum.  Review of his labs at the time of discharge on 10/1/18, WBC was 4.7, hemoglobin 9.7, , platelet count 135,000.  Differentials were 53% neutrophils, 23% lymphocytes.  There was no monocytosis, eosinophilia or basophilia.  Chemistry panel:  BUN 11, creatinine 1.1.  Ferritin was 213.  Iron binding capacity was low at 225.  TSH normal at 3.2.  Haptoglobin low at 28.  .  Folate was more than 24.  Hemoccult stool was negative.    • 2018 - B12 injections started.   • Patient has  no specific complaints since the last visit.  He remains on B12 injections.  Has discontinued alcohol ingestion.    Past Medical History:   Diagnosis Date   • B12 deficiency    • BPH (benign prostatic hyperplasia)    • Hypertension        Past Surgical History:   Procedure Laterality Date   • COLONOSCOPY           Current Outpatient Medications:   •  amLODIPine (NORVASC) 5 MG tablet, Take 1 tablet by mouth Daily., Disp: 30 tablet, Rfl: 0  •  aspirin (ASPIR-LOW) 81 MG EC tablet, Daily., Disp: , Rfl:   •  Cyanocobalamin (B-12 COMPLIANCE INJECTION) 1000 MCG/ML kit, B-12 COMPLIANCE INJECTION 1000 MCG/ML KIT, Disp: , Rfl:   •  lisinopril (PRINIVIL,ZESTRIL) 20 MG tablet, Daily., Disp: , Rfl:   •  metoprolol succinate XL (TOPROL-XL) 50 MG 24 hr tablet, Daily., Disp: , Rfl:   No current facility-administered medications for this visit.     Facility-Administered Medications Ordered in Other Visits:   •  cyanocobalamin injection 1,000 mcg, 1,000 mcg, Intramuscular, Q28 Days, Zenia Camara MD, 1,000 mcg at 06/08/20 1459    No Known Allergies    No family history on file.    Cancer-related family history is not on file.    Social History     Tobacco Use   • Smoking status: Never Smoker   • Smokeless tobacco: Never Used   Substance Use Topics   • Alcohol use: No     Frequency: Never   • Drug use: No       I have reviewed and confirmed the accuracy of the patient's history: Chief complaint, HPI and ROS as entered by the MA/LPN/RN. Zenia Camara MD 06/08/20     SUBJECTIVE:  The patient is here for a follow up appointment.  Patient denies night sweats, weight loss or fatigue symptoms.  He denies any nausea or vomiting or abdominal pain.        REVIEW OF SYSTEMS:  Review of Systems   Constitutional: Negative for chills and fever.   HENT: Negative for ear pain, mouth sores, nosebleeds and sore throat.    Eyes: Negative for photophobia and visual disturbance.   Respiratory: Negative for wheezing and stridor.   "  Cardiovascular: Negative for chest pain and palpitations.   Gastrointestinal: Negative for abdominal pain, diarrhea, nausea and vomiting.   Endocrine: Negative for cold intolerance and heat intolerance.   Genitourinary: Negative for dysuria and hematuria.   Musculoskeletal: Negative for joint swelling and neck stiffness.   Skin: Negative for color change and rash.   Neurological: Negative for seizures and syncope.   Hematological: Negative for adenopathy.        No obvious bleeding   Psychiatric/Behavioral: Negative for agitation, confusion and hallucinations.       OBJECTIVE:    Vitals:    06/08/20 1420   BP: 175/70   Pulse: 63   Resp: 16   Temp: 98.6 °F (37 °C)   Weight: 96.2 kg (212 lb)   Height: 172.7 cm (68\")   PainSc: 0-No pain       ECOG  (0) Fully active, able to carry on all predisease performance without restriction    Physical Exam   Constitutional: He is oriented to person, place, and time. No distress.   HENT:   Head: Normocephalic and atraumatic.   Eyes: Conjunctivae and EOM are normal. Right eye exhibits no discharge. Left eye exhibits no discharge. No scleral icterus.   Neck: Normal range of motion. Neck supple. No thyromegaly present.   Cardiovascular: Normal rate, regular rhythm and normal heart sounds. Exam reveals no gallop and no friction rub.   Pulmonary/Chest: Effort normal. No stridor. No respiratory distress. He has no wheezes.   Abdominal: Soft. Bowel sounds are normal. He exhibits no mass. There is no tenderness. There is no rebound and no guarding.   Musculoskeletal: Normal range of motion. He exhibits no tenderness.   Lymphadenopathy:     He has no cervical adenopathy.   Neurological: He is alert and oriented to person, place, and time. He exhibits normal muscle tone.   Skin: Skin is warm. No rash noted. He is not diaphoretic. No erythema.   Psychiatric: He has a normal mood and affect. His behavior is normal.   Nursing note and vitals reviewed.      RECENT LABS  WBC   Date Value Ref " Range Status   06/08/2020 7.82 3.40 - 10.80 10*3/mm3 Final     RBC   Date Value Ref Range Status   06/08/2020 4.72 4.14 - 5.80 10*6/mm3 Final     Hemoglobin   Date Value Ref Range Status   06/08/2020 15.0 13.0 - 17.7 g/dL Final     Hematocrit   Date Value Ref Range Status   06/08/2020 43.9 37.5 - 51.0 % Final     MCV   Date Value Ref Range Status   06/08/2020 93.0 79.0 - 97.0 fL Final     MCH   Date Value Ref Range Status   06/08/2020 31.8 26.6 - 33.0 pg Final     MCHC   Date Value Ref Range Status   06/08/2020 34.2 31.5 - 35.7 g/dL Final     RDW   Date Value Ref Range Status   06/08/2020 13.4 12.3 - 15.4 % Final     RDW-SD   Date Value Ref Range Status   06/08/2020 44.6 37.0 - 54.0 fl Final     MPV   Date Value Ref Range Status   06/08/2020 8.7 6.0 - 12.0 fL Final     Platelets   Date Value Ref Range Status   06/08/2020 196 140 - 450 10*3/mm3 Final     Neutrophil %   Date Value Ref Range Status   06/08/2020 59.8 42.7 - 76.0 % Final     Lymphocyte %   Date Value Ref Range Status   06/08/2020 27.7 19.6 - 45.3 % Final     Monocyte %   Date Value Ref Range Status   06/08/2020 9.6 5.0 - 12.0 % Final     Eosinophil %   Date Value Ref Range Status   06/08/2020 2.6 0.3 - 6.2 % Final     Basophil %   Date Value Ref Range Status   06/08/2020 0.3 0.0 - 1.5 % Final     Neutrophils, Absolute   Date Value Ref Range Status   06/08/2020 4.68 1.70 - 7.00 10*3/mm3 Final     Lymphocytes, Absolute   Date Value Ref Range Status   06/08/2020 2.17 0.70 - 3.10 10*3/mm3 Final     Monocytes, Absolute   Date Value Ref Range Status   06/08/2020 0.75 0.10 - 0.90 10*3/mm3 Final     Eosinophils, Absolute   Date Value Ref Range Status   06/08/2020 0.20 0.00 - 0.40 10*3/mm3 Final     Basophils, Absolute   Date Value Ref Range Status   06/08/2020 0.02 0.00 - 0.20 10*3/mm3 Final     nRBC   Date Value Ref Range Status   04/01/2019 0 0 /100[WBCs] Final       Lab Results   Component Value Date    GLUCOSE 110 (H) 04/01/2019    BUN 13 04/01/2019     CREATININE 1.0 04/01/2019    BCR 13.0 04/01/2019    K 4.4 04/01/2019    CO2 24 04/01/2019    CALCIUM 8.8 (L) 04/01/2019    ALBUMIN 3.1 (L) 09/29/2018    LABIL2 1.8 (H) 09/29/2018    AST 23 09/29/2018    ALT 15 (L) 09/29/2018         Assessment/Plan     B12 deficiency  - CBC & Differential  - CBC Auto Differential      ASSESSMENT:    1. Severe B12 deficiency, on B12 replacement therapy.  Ongoing     2. History of heavy alcohol use.  Now discontinued  3. Hypertension.  4. BPH.  5. Anemia    PLANS:     Patient will continue B12 injections.  We discussed signs and symptoms for him to monitor for and call should they are call.   Patient to follow-up again in 6 months but call me PRN for problems         I have reviewed labs results, imaging, vitals, and medications with the patient today. Will follow up in 6 months with me.          Patient verbalized understanding and is in agreement of the above plan.

## 2020-07-08 ENCOUNTER — HOSPITAL ENCOUNTER (OUTPATIENT)
Dept: ONCOLOGY | Facility: HOSPITAL | Age: 75
Setting detail: INFUSION SERIES
Discharge: HOME | End: 2020-07-08

## 2020-07-08 VITALS — HEIGHT: 68 IN | WEIGHT: 211.8 LBS | BODY MASS INDEX: 32.1 KG/M2

## 2020-07-08 DIAGNOSIS — E53.8 B12 DEFICIENCY: Primary | ICD-10-CM

## 2020-07-08 PROCEDURE — 25010000002 CYANOCOBALAMIN PER 1000 MCG: Performed by: INTERNAL MEDICINE

## 2020-07-08 PROCEDURE — 96372 THER/PROPH/DIAG INJ SC/IM: CPT

## 2020-07-08 RX ORDER — CYANOCOBALAMIN 1000 UG/ML
1000 INJECTION, SOLUTION INTRAMUSCULAR; SUBCUTANEOUS
Status: DISCONTINUED | OUTPATIENT
Start: 2020-07-08 | End: 2020-07-09 | Stop reason: HOSPADM

## 2020-07-08 RX ADMIN — CYANOCOBALAMIN 1000 MCG: 1000 INJECTION INTRAMUSCULAR; SUBCUTANEOUS at 10:25

## 2020-08-07 ENCOUNTER — HOSPITAL ENCOUNTER (OUTPATIENT)
Dept: ONCOLOGY | Facility: HOSPITAL | Age: 75
Setting detail: INFUSION SERIES
Discharge: HOME | End: 2020-08-07

## 2020-08-07 VITALS — BODY MASS INDEX: 33.25 KG/M2 | HEIGHT: 68 IN | WEIGHT: 219.4 LBS

## 2020-08-07 DIAGNOSIS — E53.8 B12 DEFICIENCY: Primary | ICD-10-CM

## 2020-08-07 PROCEDURE — 25010000002 CYANOCOBALAMIN PER 1000 MCG: Performed by: INTERNAL MEDICINE

## 2020-08-07 PROCEDURE — 96372 THER/PROPH/DIAG INJ SC/IM: CPT

## 2020-08-07 RX ORDER — CYANOCOBALAMIN 1000 UG/ML
1000 INJECTION, SOLUTION INTRAMUSCULAR; SUBCUTANEOUS
Status: DISCONTINUED | OUTPATIENT
Start: 2020-08-07 | End: 2020-08-08 | Stop reason: HOSPADM

## 2020-08-07 RX ADMIN — CYANOCOBALAMIN 1000 MCG: 1000 INJECTION INTRAMUSCULAR; SUBCUTANEOUS at 09:04

## 2020-09-08 ENCOUNTER — HOSPITAL ENCOUNTER (OUTPATIENT)
Dept: ONCOLOGY | Facility: HOSPITAL | Age: 75
Setting detail: INFUSION SERIES
Discharge: HOME | End: 2020-09-08

## 2020-09-08 DIAGNOSIS — E53.8 B12 DEFICIENCY: Primary | ICD-10-CM

## 2020-09-08 PROCEDURE — 96372 THER/PROPH/DIAG INJ SC/IM: CPT

## 2020-09-08 PROCEDURE — 25010000002 CYANOCOBALAMIN PER 1000 MCG: Performed by: INTERNAL MEDICINE

## 2020-09-08 RX ORDER — CYANOCOBALAMIN 1000 UG/ML
1000 INJECTION, SOLUTION INTRAMUSCULAR; SUBCUTANEOUS
Status: DISCONTINUED | OUTPATIENT
Start: 2020-09-08 | End: 2020-09-09 | Stop reason: HOSPADM

## 2020-09-08 RX ADMIN — CYANOCOBALAMIN 1000 MCG: 1000 INJECTION, SOLUTION INTRAMUSCULAR at 09:30

## 2020-10-08 ENCOUNTER — HOSPITAL ENCOUNTER (OUTPATIENT)
Dept: ONCOLOGY | Facility: HOSPITAL | Age: 75
Setting detail: INFUSION SERIES
Discharge: HOME | End: 2020-10-08

## 2020-10-08 VITALS — BODY MASS INDEX: 32.96 KG/M2 | WEIGHT: 217.5 LBS | HEIGHT: 68 IN

## 2020-10-08 DIAGNOSIS — E53.8 B12 DEFICIENCY: Primary | ICD-10-CM

## 2020-10-08 PROCEDURE — 25010000002 CYANOCOBALAMIN PER 1000 MCG: Performed by: INTERNAL MEDICINE

## 2020-10-08 PROCEDURE — 96372 THER/PROPH/DIAG INJ SC/IM: CPT

## 2020-10-08 RX ORDER — CYANOCOBALAMIN 1000 UG/ML
1000 INJECTION, SOLUTION INTRAMUSCULAR; SUBCUTANEOUS
Status: DISCONTINUED | OUTPATIENT
Start: 2020-10-08 | End: 2020-10-09 | Stop reason: HOSPADM

## 2020-10-08 RX ADMIN — CYANOCOBALAMIN 1000 MCG: 1000 INJECTION INTRAMUSCULAR; SUBCUTANEOUS at 09:33

## 2020-11-09 ENCOUNTER — HOSPITAL ENCOUNTER (OUTPATIENT)
Dept: ONCOLOGY | Facility: HOSPITAL | Age: 75
Setting detail: INFUSION SERIES
Discharge: HOME | End: 2020-11-09

## 2020-11-09 DIAGNOSIS — E53.8 B12 DEFICIENCY: Primary | ICD-10-CM

## 2020-11-09 PROCEDURE — 96372 THER/PROPH/DIAG INJ SC/IM: CPT

## 2020-11-09 PROCEDURE — 25010000002 CYANOCOBALAMIN PER 1000 MCG: Performed by: INTERNAL MEDICINE

## 2020-11-09 RX ORDER — CYANOCOBALAMIN 1000 UG/ML
1000 INJECTION, SOLUTION INTRAMUSCULAR; SUBCUTANEOUS
Status: DISCONTINUED | OUTPATIENT
Start: 2020-11-09 | End: 2020-11-10 | Stop reason: HOSPADM

## 2020-11-09 RX ADMIN — CYANOCOBALAMIN 1000 MCG: 1000 INJECTION INTRAMUSCULAR; SUBCUTANEOUS at 09:14

## 2020-12-07 NOTE — PROGRESS NOTES
Hematology/Oncology Outpatient Follow Up    PATIENT NAME:Edgardo Jacobson  :1945  MRN: [id number]  PRIMARY CARE PHYSICIAN: Maru Ramirez DO  REFERRING PHYSICIAN: Maru Ramirez DO    Chief Complaint   Patient presents with   • Follow-up     B12 deficiency        HISTORY OF PRESENT ILLNESS:     This is a 75-year-old male who was a direct admit from PCP office for severe anemia.  He reported feeling poorly with worsening dyspnea and fatigue over the past six months.  His hemoglobin was found to be 7.5 at the PCP visit, previously had been over 14.  Patient denied any signs of bleeding.  Reported abdominal bloating without melena.                  Heme/Onc was consulted for macrocytic anemia on 18.  His CBC showed WBC of 3.7, hemoglobin 7.1,  and platelet count 136,000.  Vitamin B12 was noted to be low at less than 50.  Reticulocyte was 0.74.  Patient received a total of three units of packed red blood cells in the hospital and was started on replacement therapy for B12.  He denied any recent infections or contacts with the ill, but he did complain of abdominal bloating with constipation and rectal bleeding.  Patient had CT scan of the abdomen and pelvis on 18 which was essentially unremarkable, except for diverticulosis.  Patient was seen by GI and had EGD on 18.  This showed a normal esophagus, atrophic gastritis with retained food in the stomach from breakfast six hours earlier.  Normal duodenum.  Review of his labs at the time of discharge on 10/1/18, WBC was 4.7, hemoglobin 9.7, , platelet count 135,000.  Differentials were 53% neutrophils, 23% lymphocytes.  There was no monocytosis, eosinophilia or basophilia.  Chemistry panel:  BUN 11, creatinine 1.1.  Ferritin was 213.  Iron binding capacity was low at 225.  TSH normal at 3.2.  Haptoglobin low at 28.  .  Folate was more than 24.  Hemoccult stool was negative.    • 2018 - B12 injections started.   • Patient  has no specific complaints since the last visit.  He remains on B12 injections.  Has discontinued alcohol ingestion.  Has not had any recent hospitalization.  He denies fevers, chills, nausea or vomiting.    Past Medical History:   Diagnosis Date   • B12 deficiency    • BPH (benign prostatic hyperplasia)    • Hypertension        Past Surgical History:   Procedure Laterality Date   • COLONOSCOPY           Current Outpatient Medications:   •  amLODIPine (NORVASC) 5 MG tablet, Take 1 tablet by mouth Daily., Disp: 30 tablet, Rfl: 0  •  aspirin (ASPIR-LOW) 81 MG EC tablet, Daily., Disp: , Rfl:   •  Cyanocobalamin (B-12 COMPLIANCE INJECTION) 1000 MCG/ML kit, B-12 COMPLIANCE INJECTION 1000 MCG/ML KIT, Disp: , Rfl:   •  lisinopril (PRINIVIL,ZESTRIL) 20 MG tablet, Daily., Disp: , Rfl:   •  metoprolol succinate XL (TOPROL-XL) 50 MG 24 hr tablet, Daily., Disp: , Rfl:     No Known Allergies    No family history on file.    Cancer-related family history is not on file.    Social History     Tobacco Use   • Smoking status: Never Smoker   • Smokeless tobacco: Never Used   Substance Use Topics   • Alcohol use: No     Frequency: Never   • Drug use: No       I have reviewed and confirmed the accuracy of the patient's history: Chief complaint, HPI and ROS as entered by the MA/LPN/RN. Zenia Camara MD 12/08/20     SUBJECTIVE:    The patient is here for a follow up appointment.    Remains a symptomatic    REVIEW OF SYSTEMS:  Review of Systems   Constitutional: Negative for chills and fever.   HENT: Negative for ear pain, mouth sores, nosebleeds and sore throat.    Eyes: Negative for photophobia and visual disturbance.   Respiratory: Negative for wheezing and stridor.    Cardiovascular: Negative for chest pain and palpitations.   Gastrointestinal: Negative for abdominal pain, diarrhea, nausea and vomiting.   Endocrine: Negative for cold intolerance and heat intolerance.   Genitourinary: Negative for dysuria and hematuria.  "  Musculoskeletal: Negative for joint swelling and neck stiffness.   Skin: Negative for color change and rash.   Neurological: Negative for seizures and syncope.   Hematological: Negative for adenopathy.        No obvious bleeding   Psychiatric/Behavioral: Negative for agitation, confusion and hallucinations.   I have reviewed and confirmed the accuracy of the ROS as documented by the MA/LPN/RN Zenia Camara MD      OBJECTIVE:    Vitals:    12/08/20 0922   BP: 176/93   Pulse: 71   Resp: 20   Temp: 96.4 °F (35.8 °C)   TempSrc: Temporal   Weight: 100 kg (221 lb)   Height: 172.7 cm (68\")   PainSc: 0-No pain       ECOG  (0) Fully active, able to carry on all predisease performance without restriction    Physical Exam   Constitutional: He is oriented to person, place, and time. No distress.   HENT:   Head: Normocephalic and atraumatic.   Eyes: Conjunctivae are normal. Right eye exhibits no discharge. Left eye exhibits no discharge. No scleral icterus.   Neck: Normal range of motion. Neck supple. No thyromegaly present.   Cardiovascular: Normal rate, regular rhythm and normal heart sounds. Exam reveals no gallop and no friction rub.   Pulmonary/Chest: Effort normal. No stridor. No respiratory distress. He has no wheezes.   Abdominal: Soft. Bowel sounds are normal. He exhibits no mass. There is no abdominal tenderness. There is no rebound and no guarding.   Musculoskeletal: Normal range of motion. No tenderness.   Lymphadenopathy:     He has no cervical adenopathy.   Neurological: He is alert and oriented to person, place, and time. He exhibits normal muscle tone.   Skin: Skin is warm. No rash noted. He is not diaphoretic. No erythema.   Psychiatric: His behavior is normal.   Nursing note and vitals reviewed.  I have reexamined the patient and the results are consistent with the previously documented exam. Zenia Camara MD     RECENT LABS    WBC   Date Value Ref Range Status   12/08/2020 6.05 3.40 - 10.80 " 10*3/mm3 Final     RBC   Date Value Ref Range Status   12/08/2020 4.89 4.14 - 5.80 10*6/mm3 Final     Hemoglobin   Date Value Ref Range Status   12/08/2020 15.4 13.0 - 17.7 g/dL Final     Hematocrit   Date Value Ref Range Status   12/08/2020 44.5 37.5 - 51.0 % Final     MCV   Date Value Ref Range Status   12/08/2020 91.0 79.0 - 97.0 fL Final     MCH   Date Value Ref Range Status   12/08/2020 31.5 26.6 - 33.0 pg Final     MCHC   Date Value Ref Range Status   12/08/2020 34.6 31.5 - 35.7 g/dL Final     RDW   Date Value Ref Range Status   12/08/2020 13.0 12.3 - 15.4 % Final     RDW-SD   Date Value Ref Range Status   12/08/2020 42.8 37.0 - 54.0 fl Final     MPV   Date Value Ref Range Status   12/08/2020 8.8 6.0 - 12.0 fL Final     Platelets   Date Value Ref Range Status   12/08/2020 179 140 - 450 10*3/mm3 Final     Neutrophil %   Date Value Ref Range Status   12/08/2020 56.3 42.7 - 76.0 % Final     Lymphocyte %   Date Value Ref Range Status   12/08/2020 30.1 19.6 - 45.3 % Final     Monocyte %   Date Value Ref Range Status   12/08/2020 9.3 5.0 - 12.0 % Final     Eosinophil %   Date Value Ref Range Status   12/08/2020 4.1 0.3 - 6.2 % Final     Basophil %   Date Value Ref Range Status   12/08/2020 0.2 0.0 - 1.5 % Final     Neutrophils, Absolute   Date Value Ref Range Status   12/08/2020 3.41 1.70 - 7.00 10*3/mm3 Final     Lymphocytes, Absolute   Date Value Ref Range Status   12/08/2020 1.82 0.70 - 3.10 10*3/mm3 Final     Monocytes, Absolute   Date Value Ref Range Status   12/08/2020 0.56 0.10 - 0.90 10*3/mm3 Final     Eosinophils, Absolute   Date Value Ref Range Status   12/08/2020 0.25 0.00 - 0.40 10*3/mm3 Final     Basophils, Absolute   Date Value Ref Range Status   12/08/2020 0.01 0.00 - 0.20 10*3/mm3 Final     nRBC   Date Value Ref Range Status   04/01/2019 0 0 /100[WBCs] Final       Lab Results   Component Value Date    GLUCOSE 110 (H) 04/01/2019    BUN 13 04/01/2019    CREATININE 1.0 04/01/2019    BCR 13.0 04/01/2019     K 4.4 04/01/2019    CO2 24 04/01/2019    CALCIUM 8.8 (L) 04/01/2019    ALBUMIN 3.1 (L) 09/29/2018    LABIL2 1.8 (H) 09/29/2018    AST 23 09/29/2018    ALT 15 (L) 09/29/2018         ASSESSMENT:    1. Severe B12 deficiency, on B12 replacement therapy.  Ongoing management     2. History of heavy alcohol use.  Now discontinued  3. Hypertension.  4. BPH.  5. Anemia    PLANS:     Patient will continue B12 injections.  We discussed signs and symptoms for him to monitor for and call should they are call.   Patient to follow-up again in 6 months but call me PRN for problems'  Patient is due for B12 injection today  Keep follow-up appointments with his PCP         I have reviewed labs results, imaging, vitals, and medications with the patient today. Will follow up in 6 months with me.          Patient verbalized understanding and is in agreement of the above plan.

## 2020-12-08 ENCOUNTER — HOSPITAL ENCOUNTER (OUTPATIENT)
Dept: ONCOLOGY | Facility: HOSPITAL | Age: 75
Setting detail: INFUSION SERIES
Discharge: HOME | End: 2020-12-08

## 2020-12-08 ENCOUNTER — OFFICE VISIT (OUTPATIENT)
Dept: ONCOLOGY | Facility: CLINIC | Age: 75
End: 2020-12-08

## 2020-12-08 ENCOUNTER — LAB (OUTPATIENT)
Dept: LAB | Facility: HOSPITAL | Age: 75
End: 2020-12-08

## 2020-12-08 VITALS
HEIGHT: 68 IN | HEART RATE: 71 BPM | DIASTOLIC BLOOD PRESSURE: 93 MMHG | TEMPERATURE: 96.4 F | WEIGHT: 221 LBS | RESPIRATION RATE: 20 BRPM | BODY MASS INDEX: 33.49 KG/M2 | SYSTOLIC BLOOD PRESSURE: 176 MMHG

## 2020-12-08 DIAGNOSIS — E53.8 B12 DEFICIENCY: Primary | ICD-10-CM

## 2020-12-08 DIAGNOSIS — E53.8 B12 DEFICIENCY: ICD-10-CM

## 2020-12-08 LAB
BASOPHILS # BLD AUTO: 0.01 10*3/MM3 (ref 0–0.2)
BASOPHILS NFR BLD AUTO: 0.2 % (ref 0–1.5)
DEPRECATED RDW RBC AUTO: 42.8 FL (ref 37–54)
EOSINOPHIL # BLD AUTO: 0.25 10*3/MM3 (ref 0–0.4)
EOSINOPHIL NFR BLD AUTO: 4.1 % (ref 0.3–6.2)
ERYTHROCYTE [DISTWIDTH] IN BLOOD BY AUTOMATED COUNT: 13 % (ref 12.3–15.4)
HCT VFR BLD AUTO: 44.5 % (ref 37.5–51)
HGB BLD-MCNC: 15.4 G/DL (ref 13–17.7)
LYMPHOCYTES # BLD AUTO: 1.82 10*3/MM3 (ref 0.7–3.1)
LYMPHOCYTES NFR BLD AUTO: 30.1 % (ref 19.6–45.3)
MCH RBC QN AUTO: 31.5 PG (ref 26.6–33)
MCHC RBC AUTO-ENTMCNC: 34.6 G/DL (ref 31.5–35.7)
MCV RBC AUTO: 91 FL (ref 79–97)
MONOCYTES # BLD AUTO: 0.56 10*3/MM3 (ref 0.1–0.9)
MONOCYTES NFR BLD AUTO: 9.3 % (ref 5–12)
NEUTROPHILS NFR BLD AUTO: 3.41 10*3/MM3 (ref 1.7–7)
NEUTROPHILS NFR BLD AUTO: 56.3 % (ref 42.7–76)
PLATELET # BLD AUTO: 179 10*3/MM3 (ref 140–450)
PMV BLD AUTO: 8.8 FL (ref 6–12)
RBC # BLD AUTO: 4.89 10*6/MM3 (ref 4.14–5.8)
WBC # BLD AUTO: 6.05 10*3/MM3 (ref 3.4–10.8)

## 2020-12-08 PROCEDURE — 99213 OFFICE O/P EST LOW 20 MIN: CPT | Performed by: INTERNAL MEDICINE

## 2020-12-08 PROCEDURE — 36415 COLL VENOUS BLD VENIPUNCTURE: CPT

## 2020-12-08 PROCEDURE — 96372 THER/PROPH/DIAG INJ SC/IM: CPT

## 2020-12-08 PROCEDURE — 25010000002 CYANOCOBALAMIN PER 1000 MCG: Performed by: INTERNAL MEDICINE

## 2020-12-08 PROCEDURE — 85025 COMPLETE CBC W/AUTO DIFF WBC: CPT

## 2020-12-08 RX ORDER — CYANOCOBALAMIN 1000 UG/ML
1000 INJECTION, SOLUTION INTRAMUSCULAR; SUBCUTANEOUS
Status: DISCONTINUED | OUTPATIENT
Start: 2020-12-08 | End: 2020-12-09 | Stop reason: HOSPADM

## 2020-12-08 RX ADMIN — CYANOCOBALAMIN 1000 MCG: 1000 INJECTION INTRAMUSCULAR; SUBCUTANEOUS at 10:08

## 2020-12-08 NOTE — PROGRESS NOTES
Patient advised to call PCP regarding BP, no caffeine today and to recheck BP later this evening.  He VU

## 2020-12-18 ENCOUNTER — TELEPHONE (OUTPATIENT)
Dept: CARDIOLOGY | Facility: CLINIC | Age: 75
End: 2020-12-18

## 2020-12-18 NOTE — TELEPHONE ENCOUNTER
Received a refill request from [facility], but the patient has not been seen since May 2019, we have attempted to reach the patient and still no future appointments scheduled. I tried calling the patient, no answer, LMOM

## 2021-01-05 ENCOUNTER — HOSPITAL ENCOUNTER (OUTPATIENT)
Dept: ONCOLOGY | Facility: HOSPITAL | Age: 76
Setting detail: INFUSION SERIES
Discharge: HOME | End: 2021-01-05

## 2021-01-05 VITALS — HEIGHT: 68 IN | BODY MASS INDEX: 33.34 KG/M2 | WEIGHT: 220 LBS

## 2021-01-05 DIAGNOSIS — E53.8 B12 DEFICIENCY: Primary | ICD-10-CM

## 2021-01-05 PROCEDURE — 96372 THER/PROPH/DIAG INJ SC/IM: CPT

## 2021-01-05 PROCEDURE — 25010000002 CYANOCOBALAMIN PER 1000 MCG: Performed by: INTERNAL MEDICINE

## 2021-01-05 RX ORDER — CYANOCOBALAMIN 1000 UG/ML
1000 INJECTION, SOLUTION INTRAMUSCULAR; SUBCUTANEOUS
Status: DISCONTINUED | OUTPATIENT
Start: 2021-01-05 | End: 2021-01-06 | Stop reason: HOSPADM

## 2021-01-05 RX ADMIN — CYANOCOBALAMIN 1000 MCG: 1000 INJECTION INTRAMUSCULAR; SUBCUTANEOUS at 11:26

## 2021-02-02 ENCOUNTER — HOSPITAL ENCOUNTER (OUTPATIENT)
Dept: ONCOLOGY | Facility: HOSPITAL | Age: 76
Setting detail: INFUSION SERIES
Discharge: HOME | End: 2021-02-02

## 2021-02-02 VITALS — WEIGHT: 218.6 LBS | BODY MASS INDEX: 33.13 KG/M2 | HEIGHT: 68 IN

## 2021-02-02 DIAGNOSIS — E53.8 B12 DEFICIENCY: Primary | ICD-10-CM

## 2021-02-02 PROCEDURE — 96372 THER/PROPH/DIAG INJ SC/IM: CPT

## 2021-02-02 PROCEDURE — 25010000002 CYANOCOBALAMIN PER 1000 MCG: Performed by: INTERNAL MEDICINE

## 2021-02-02 RX ORDER — CYANOCOBALAMIN 1000 UG/ML
1000 INJECTION, SOLUTION INTRAMUSCULAR; SUBCUTANEOUS
Status: DISCONTINUED | OUTPATIENT
Start: 2021-02-02 | End: 2021-02-03 | Stop reason: HOSPADM

## 2021-02-02 RX ADMIN — CYANOCOBALAMIN 1000 MCG: 1000 INJECTION INTRAMUSCULAR; SUBCUTANEOUS at 10:03

## 2021-03-02 ENCOUNTER — HOSPITAL ENCOUNTER (OUTPATIENT)
Dept: ONCOLOGY | Facility: HOSPITAL | Age: 76
Setting detail: INFUSION SERIES
Discharge: HOME | End: 2021-03-02

## 2021-03-02 VITALS — WEIGHT: 218 LBS | HEIGHT: 68 IN | BODY MASS INDEX: 33.04 KG/M2

## 2021-03-02 DIAGNOSIS — E53.8 B12 DEFICIENCY: Primary | ICD-10-CM

## 2021-03-02 PROCEDURE — 25010000002 CYANOCOBALAMIN PER 1000 MCG: Performed by: INTERNAL MEDICINE

## 2021-03-02 PROCEDURE — 96372 THER/PROPH/DIAG INJ SC/IM: CPT

## 2021-03-02 RX ORDER — CYANOCOBALAMIN 1000 UG/ML
1000 INJECTION, SOLUTION INTRAMUSCULAR; SUBCUTANEOUS
Status: DISCONTINUED | OUTPATIENT
Start: 2021-03-02 | End: 2021-03-03 | Stop reason: HOSPADM

## 2021-03-02 RX ADMIN — CYANOCOBALAMIN 1000 MCG: 1000 INJECTION INTRAMUSCULAR; SUBCUTANEOUS at 09:20

## 2021-04-06 ENCOUNTER — HOSPITAL ENCOUNTER (OUTPATIENT)
Dept: ONCOLOGY | Facility: HOSPITAL | Age: 76
Setting detail: INFUSION SERIES
Discharge: HOME | End: 2021-04-06

## 2021-04-06 VITALS — WEIGHT: 219.4 LBS | BODY MASS INDEX: 33.25 KG/M2 | HEIGHT: 68 IN

## 2021-04-06 DIAGNOSIS — E53.8 B12 DEFICIENCY: Primary | ICD-10-CM

## 2021-04-06 PROCEDURE — 25010000002 CYANOCOBALAMIN PER 1000 MCG: Performed by: INTERNAL MEDICINE

## 2021-04-06 PROCEDURE — 96372 THER/PROPH/DIAG INJ SC/IM: CPT

## 2021-04-06 RX ORDER — CYANOCOBALAMIN 1000 UG/ML
1000 INJECTION, SOLUTION INTRAMUSCULAR; SUBCUTANEOUS
Status: DISCONTINUED | OUTPATIENT
Start: 2021-04-06 | End: 2021-04-07 | Stop reason: HOSPADM

## 2021-04-06 RX ADMIN — CYANOCOBALAMIN 1000 MCG: 1000 INJECTION INTRAMUSCULAR; SUBCUTANEOUS at 09:02

## 2021-05-04 ENCOUNTER — HOSPITAL ENCOUNTER (OUTPATIENT)
Dept: ONCOLOGY | Facility: HOSPITAL | Age: 76
Setting detail: INFUSION SERIES
Discharge: HOME | End: 2021-05-04

## 2021-05-04 VITALS — HEIGHT: 68 IN | BODY MASS INDEX: 33.4 KG/M2 | WEIGHT: 220.4 LBS

## 2021-05-04 DIAGNOSIS — E53.8 B12 DEFICIENCY: Primary | ICD-10-CM

## 2021-05-04 PROCEDURE — 96372 THER/PROPH/DIAG INJ SC/IM: CPT

## 2021-05-04 PROCEDURE — 25010000002 CYANOCOBALAMIN PER 1000 MCG: Performed by: INTERNAL MEDICINE

## 2021-05-04 RX ORDER — CYANOCOBALAMIN 1000 UG/ML
1000 INJECTION, SOLUTION INTRAMUSCULAR; SUBCUTANEOUS
Status: DISCONTINUED | OUTPATIENT
Start: 2021-05-04 | End: 2021-05-05 | Stop reason: HOSPADM

## 2021-05-04 RX ADMIN — CYANOCOBALAMIN 1000 MCG: 1000 INJECTION INTRAMUSCULAR; SUBCUTANEOUS at 09:36

## 2021-05-26 RX ORDER — AMLODIPINE BESYLATE 5 MG/1
TABLET ORAL
Qty: 30 TABLET | Refills: 2 | OUTPATIENT
Start: 2021-05-26

## 2021-06-07 NOTE — PROGRESS NOTES
Hematology/Oncology Outpatient Follow Up    PATIENT NAME:Edgardo Jacobson  :1945  MRN: [id number]  PRIMARY CARE PHYSICIAN: Darren Mobley PA-C  REFERRING PHYSICIAN: Darren Mobley PA-C    Chief Complaint   Patient presents with   • Follow-up     B12 deficiency        HISTORY OF PRESENT ILLNESS:     This is a 75-year-old male who was a direct admit from PCP office for severe anemia.  He reported feeling poorly with worsening dyspnea and fatigue over the past six months.  His hemoglobin was found to be 7.5 at the PCP visit, previously had been over 14.  Patient denied any signs of bleeding.  Reported abdominal bloating without melena.                  Heme/Onc was consulted for macrocytic anemia on 18.  His CBC showed WBC of 3.7, hemoglobin 7.1,  and platelet count 136,000.  Vitamin B12 was noted to be low at less than 50.  Reticulocyte was 0.74.  Patient received a total of three units of packed red blood cells in the hospital and was started on replacement therapy for B12.  He denied any recent infections or contacts with the ill, but he did complain of abdominal bloating with constipation and rectal bleeding.  Patient had CT scan of the abdomen and pelvis on 18 which was essentially unremarkable, except for diverticulosis.  Patient was seen by GI and had EGD on 18.  This showed a normal esophagus, atrophic gastritis with retained food in the stomach from breakfast six hours earlier.  Normal duodenum.  Review of his labs at the time of discharge on 10/1/18, WBC was 4.7, hemoglobin 9.7, , platelet count 135,000.  Differentials were 53% neutrophils, 23% lymphocytes.  There was no monocytosis, eosinophilia or basophilia.  Chemistry panel:  BUN 11, creatinine 1.1.  Ferritin was 213.  Iron binding capacity was low at 225.  TSH normal at 3.2.  Haptoglobin low at 28.  .  Folate was more than 24.  Hemoccult stool was negative.    • 2018 - B12 injections started.    • Patient has no specific complaints since the last visit.  He remains on B12 injections.  Has discontinued alcohol ingestion.  Has not had any recent hospitalization.  He denies fevers, chills, nausea or vomiting.  • 6/8/2021: Patient here for follow up appt for vitamin b12 deficiency.    Past Medical History:   Diagnosis Date   • B12 deficiency    • BPH (benign prostatic hyperplasia)    • Hypertension        Past Surgical History:   Procedure Laterality Date   • COLONOSCOPY           Current Outpatient Medications:   •  amLODIPine (NORVASC) 5 MG tablet, Take 1 tablet by mouth Daily., Disp: 30 tablet, Rfl: 0  •  aspirin (ASPIR-LOW) 81 MG EC tablet, Daily., Disp: , Rfl:   •  Cyanocobalamin (B-12 COMPLIANCE INJECTION) 1000 MCG/ML kit, B-12 COMPLIANCE INJECTION 1000 MCG/ML KIT, Disp: , Rfl:   •  lisinopril (PRINIVIL,ZESTRIL) 20 MG tablet, Daily., Disp: , Rfl:   •  metoprolol succinate XL (TOPROL-XL) 50 MG 24 hr tablet, Daily., Disp: , Rfl:     No Known Allergies    History reviewed. No pertinent family history.    Cancer-related family history is not on file.    Social History     Tobacco Use   • Smoking status: Never Smoker   • Smokeless tobacco: Never Used   Substance Use Topics   • Alcohol use: No   • Drug use: No       I have reviewed and confirmed the accuracy of the patient's history: Chief complaint, HPI and ROS as entered by the MA/LPN/RN. Jocelyn Barrett, MILTON 06/08/21     SUBJECTIVE:    The patient is here for a follow up appointment.    Remains a symptomatic    REVIEW OF SYSTEMS:  Review of Systems   Constitutional: Negative for activity change, appetite change, chills, fatigue and fever.   HENT: Negative for ear pain, facial swelling, hearing loss, mouth sores, nosebleeds, sinus pressure, sinus pain, sore throat and trouble swallowing.    Eyes: Negative for photophobia and visual disturbance.   Respiratory: Negative for cough, chest tightness, shortness of breath, wheezing and stridor.    Cardiovascular:  "Negative for chest pain and palpitations.   Gastrointestinal: Negative for abdominal pain, anal bleeding, blood in stool, constipation, diarrhea, nausea and vomiting.   Endocrine: Negative for cold intolerance and heat intolerance.   Genitourinary: Negative for decreased urine volume, difficulty urinating, discharge, dysuria, hematuria, penile pain, scrotal swelling, testicular pain and urgency.   Musculoskeletal: Negative for back pain, gait problem, joint swelling, neck pain and neck stiffness.   Skin: Negative for color change and rash.   Allergic/Immunologic: Negative for environmental allergies and food allergies.   Neurological: Negative for dizziness, seizures, syncope, speech difficulty, weakness, numbness and headaches.   Hematological: Negative for adenopathy. Bruises/bleeds easily.        No obvious bleeding   Psychiatric/Behavioral: Negative for agitation, behavioral problems, confusion, hallucinations and sleep disturbance.   I have reviewed and confirmed the accuracy of the ROS as documented by the MA/LPN/RN MILTON Mercado      OBJECTIVE:    Vitals:    06/08/21 0913 06/08/21 1012   BP: (!) 188/85 160/90   Pulse: 65    Resp: 18    Temp: 98 °F (36.7 °C)    Weight: 99.8 kg (220 lb)    Height: 172.7 cm (68\")    PainSc: 0-No pain        ECOG  (0) Fully active, able to carry on all predisease performance without restriction    Physical Exam   Constitutional: He is oriented to person, place, and time. No distress.   HENT:   Head: Normocephalic and atraumatic.   Right Ear: Tympanic membrane, external ear and ear canal normal.   Left Ear: Tympanic membrane, external ear and ear canal normal.   Nose: Nose normal. No rhinorrhea or congestion.   Mouth/Throat: Mucous membranes are moist. No oropharyngeal exudate or posterior oropharyngeal erythema. Oropharynx is clear.   Eyes: Pupils are equal, round, and reactive to light. Conjunctivae are normal. Right eye exhibits no discharge. Left eye exhibits no " discharge. No scleral icterus.   Neck: No thyromegaly present.   Cardiovascular: Normal rate, regular rhythm and normal heart sounds. Exam reveals no gallop and no friction rub.   Pulmonary/Chest: Effort normal. No stridor. No respiratory distress. He has no wheezes.   Abdominal: Soft. Bowel sounds are normal. He exhibits no distension and no mass. There is no abdominal tenderness. There is no rebound and no guarding.   Genitourinary: Rectum:      Guaiac result negative.     Musculoskeletal: Normal range of motion. No swelling or tenderness.      Right lower leg: No edema.      Left lower leg: No edema.   Lymphadenopathy:     He has no cervical adenopathy.   Neurological: He is alert and oriented to person, place, and time. He exhibits normal muscle tone.   Skin: Skin is warm and dry. Capillary refill takes less than 2 seconds. No bruising, no lesion and no rash noted. He is not diaphoretic. No erythema. No jaundice.   Psychiatric: His behavior is normal. Mood, judgment and thought content normal.   Nursing note and vitals reviewed.  I have reexamined the patient and the results are consistent with the previously documented exam. Jocelyn Barrett, MILTON     RECENT LABS    WBC   Date Value Ref Range Status   06/08/2021 7.27 3.40 - 10.80 10*3/mm3 Final     RBC   Date Value Ref Range Status   06/08/2021 4.70 4.14 - 5.80 10*6/mm3 Final     Hemoglobin   Date Value Ref Range Status   06/08/2021 15.1 13.0 - 17.7 g/dL Final     Hematocrit   Date Value Ref Range Status   06/08/2021 43.8 37.5 - 51.0 % Final     MCV   Date Value Ref Range Status   06/08/2021 93.2 79.0 - 97.0 fL Final     MCH   Date Value Ref Range Status   06/08/2021 32.1 26.6 - 33.0 pg Final     MCHC   Date Value Ref Range Status   06/08/2021 34.5 31.5 - 35.7 g/dL Final     RDW   Date Value Ref Range Status   06/08/2021 12.9 12.3 - 15.4 % Final     RDW-SD   Date Value Ref Range Status   06/08/2021 42.3 37.0 - 54.0 fl Final     MPV   Date Value Ref Range Status    06/08/2021 8.5 6.0 - 12.0 fL Final     Platelets   Date Value Ref Range Status   06/08/2021 194 140 - 450 10*3/mm3 Final     Neutrophil %   Date Value Ref Range Status   06/08/2021 59.2 42.7 - 76.0 % Final     Lymphocyte %   Date Value Ref Range Status   06/08/2021 27.6 19.6 - 45.3 % Final     Monocyte %   Date Value Ref Range Status   06/08/2021 10.0 5.0 - 12.0 % Final     Eosinophil %   Date Value Ref Range Status   06/08/2021 2.9 0.3 - 6.2 % Final     Basophil %   Date Value Ref Range Status   06/08/2021 0.3 0.0 - 1.5 % Final     Neutrophils, Absolute   Date Value Ref Range Status   06/08/2021 4.30 1.70 - 7.00 10*3/mm3 Final     Lymphocytes, Absolute   Date Value Ref Range Status   06/08/2021 2.01 0.70 - 3.10 10*3/mm3 Final     Monocytes, Absolute   Date Value Ref Range Status   06/08/2021 0.73 0.10 - 0.90 10*3/mm3 Final     Eosinophils, Absolute   Date Value Ref Range Status   06/08/2021 0.21 0.00 - 0.40 10*3/mm3 Final     Basophils, Absolute   Date Value Ref Range Status   06/08/2021 0.02 0.00 - 0.20 10*3/mm3 Final     nRBC   Date Value Ref Range Status   04/01/2019 0 0 /100[WBCs] Final       Lab Results   Component Value Date    GLUCOSE 110 (H) 04/01/2019    BUN 13 04/01/2019    CREATININE 1.0 04/01/2019    BCR 13.0 04/01/2019    K 4.4 04/01/2019    CO2 24 04/01/2019    CALCIUM 8.8 (L) 04/01/2019    ALBUMIN 3.1 (L) 09/29/2018    LABIL2 1.8 (H) 09/29/2018    AST 23 09/29/2018    ALT 15 (L) 09/29/2018         ASSESSMENT:    1. Severe B12 deficiency, on B12 replacement therapy.  Ongoing management     2. History of heavy alcohol use.  Now discontinued since 2004  3. Hypertension.   4. BPH.  5. Anemia: hemoglobin stable.    PLANS:     1. Patient will continue B12 injections.  We discussed signs and symptoms for him to monitor for and call should they are call.   2.  Vitamin b12 injection today and monthly.  3. Blood pressure elevated today, patient took medication at 830-9am.  Encouraged patient to take a blood  pressure log for one week including morning and evening blood pressures then call his results to his PCP provider Dr.Joshua Mobley.    4.  Follow up in 6 months with Dr. Camara with cbc/diff or PRN for arising problems.             I have reviewed labs results, imaging, vitals, and medications with the patient today. Will follow up in 6 months with           Patient verbalized understanding and is in agreement of the above plan.      Electronically signed by MILTON Mercado, 06/08/21, 10:23 AM EDT.

## 2021-06-08 ENCOUNTER — OFFICE VISIT (OUTPATIENT)
Dept: ONCOLOGY | Facility: CLINIC | Age: 76
End: 2021-06-08

## 2021-06-08 ENCOUNTER — HOSPITAL ENCOUNTER (OUTPATIENT)
Dept: ONCOLOGY | Facility: HOSPITAL | Age: 76
Setting detail: INFUSION SERIES
Discharge: HOME | End: 2021-06-08

## 2021-06-08 ENCOUNTER — LAB (OUTPATIENT)
Dept: LAB | Facility: HOSPITAL | Age: 76
End: 2021-06-08

## 2021-06-08 VITALS
HEIGHT: 68 IN | DIASTOLIC BLOOD PRESSURE: 90 MMHG | HEART RATE: 65 BPM | TEMPERATURE: 98 F | WEIGHT: 220 LBS | RESPIRATION RATE: 18 BRPM | SYSTOLIC BLOOD PRESSURE: 160 MMHG | BODY MASS INDEX: 33.34 KG/M2

## 2021-06-08 DIAGNOSIS — E53.8 B12 DEFICIENCY: Primary | ICD-10-CM

## 2021-06-08 DIAGNOSIS — E53.8 B12 DEFICIENCY: ICD-10-CM

## 2021-06-08 LAB
BASOPHILS # BLD AUTO: 0.02 10*3/MM3 (ref 0–0.2)
BASOPHILS NFR BLD AUTO: 0.3 % (ref 0–1.5)
DEPRECATED RDW RBC AUTO: 42.3 FL (ref 37–54)
EOSINOPHIL # BLD AUTO: 0.21 10*3/MM3 (ref 0–0.4)
EOSINOPHIL NFR BLD AUTO: 2.9 % (ref 0.3–6.2)
ERYTHROCYTE [DISTWIDTH] IN BLOOD BY AUTOMATED COUNT: 12.9 % (ref 12.3–15.4)
HCT VFR BLD AUTO: 43.8 % (ref 37.5–51)
HGB BLD-MCNC: 15.1 G/DL (ref 13–17.7)
LYMPHOCYTES # BLD AUTO: 2.01 10*3/MM3 (ref 0.7–3.1)
LYMPHOCYTES NFR BLD AUTO: 27.6 % (ref 19.6–45.3)
MCH RBC QN AUTO: 32.1 PG (ref 26.6–33)
MCHC RBC AUTO-ENTMCNC: 34.5 G/DL (ref 31.5–35.7)
MCV RBC AUTO: 93.2 FL (ref 79–97)
MONOCYTES # BLD AUTO: 0.73 10*3/MM3 (ref 0.1–0.9)
MONOCYTES NFR BLD AUTO: 10 % (ref 5–12)
NEUTROPHILS NFR BLD AUTO: 4.3 10*3/MM3 (ref 1.7–7)
NEUTROPHILS NFR BLD AUTO: 59.2 % (ref 42.7–76)
PLATELET # BLD AUTO: 194 10*3/MM3 (ref 140–450)
PMV BLD AUTO: 8.5 FL (ref 6–12)
RBC # BLD AUTO: 4.7 10*6/MM3 (ref 4.14–5.8)
WBC # BLD AUTO: 7.27 10*3/MM3 (ref 3.4–10.8)

## 2021-06-08 PROCEDURE — 25010000002 CYANOCOBALAMIN PER 1000 MCG: Performed by: INTERNAL MEDICINE

## 2021-06-08 PROCEDURE — 99213 OFFICE O/P EST LOW 20 MIN: CPT | Performed by: INTERNAL MEDICINE

## 2021-06-08 PROCEDURE — 85025 COMPLETE CBC W/AUTO DIFF WBC: CPT

## 2021-06-08 PROCEDURE — 36415 COLL VENOUS BLD VENIPUNCTURE: CPT

## 2021-06-08 PROCEDURE — 96372 THER/PROPH/DIAG INJ SC/IM: CPT

## 2021-06-08 RX ORDER — CYANOCOBALAMIN 1000 UG/ML
1000 INJECTION, SOLUTION INTRAMUSCULAR; SUBCUTANEOUS
Status: DISCONTINUED | OUTPATIENT
Start: 2021-06-08 | End: 2021-06-09 | Stop reason: HOSPADM

## 2021-06-08 RX ADMIN — CYANOCOBALAMIN 1000 MCG: 1000 INJECTION INTRAMUSCULAR; SUBCUTANEOUS at 10:37

## 2021-06-24 RX ORDER — AMLODIPINE BESYLATE 5 MG/1
TABLET ORAL
Qty: 30 TABLET | Refills: 2 | OUTPATIENT
Start: 2021-06-24

## 2021-07-06 ENCOUNTER — HOSPITAL ENCOUNTER (OUTPATIENT)
Dept: ONCOLOGY | Facility: HOSPITAL | Age: 76
Setting detail: INFUSION SERIES
Discharge: HOME | End: 2021-07-06

## 2021-07-06 VITALS — HEIGHT: 68 IN | WEIGHT: 221.2 LBS | BODY MASS INDEX: 33.52 KG/M2

## 2021-07-06 DIAGNOSIS — E53.8 B12 DEFICIENCY: Primary | ICD-10-CM

## 2021-07-06 PROCEDURE — 25010000002 CYANOCOBALAMIN PER 1000 MCG: Performed by: INTERNAL MEDICINE

## 2021-07-06 PROCEDURE — 96372 THER/PROPH/DIAG INJ SC/IM: CPT

## 2021-07-06 RX ORDER — CYANOCOBALAMIN 1000 UG/ML
1000 INJECTION, SOLUTION INTRAMUSCULAR; SUBCUTANEOUS
Status: DISCONTINUED | OUTPATIENT
Start: 2021-07-06 | End: 2021-07-07 | Stop reason: HOSPADM

## 2021-07-06 RX ADMIN — CYANOCOBALAMIN 1000 MCG: 1000 INJECTION INTRAMUSCULAR; SUBCUTANEOUS at 08:44

## 2021-08-03 ENCOUNTER — HOSPITAL ENCOUNTER (OUTPATIENT)
Dept: ONCOLOGY | Facility: HOSPITAL | Age: 76
Setting detail: INFUSION SERIES
Discharge: HOME | End: 2021-08-03

## 2021-08-03 VITALS — WEIGHT: 222.2 LBS | BODY MASS INDEX: 33.68 KG/M2 | HEIGHT: 68 IN

## 2021-08-03 DIAGNOSIS — E53.8 B12 DEFICIENCY: Primary | ICD-10-CM

## 2021-08-03 PROCEDURE — 96372 THER/PROPH/DIAG INJ SC/IM: CPT

## 2021-08-03 PROCEDURE — 25010000002 CYANOCOBALAMIN PER 1000 MCG: Performed by: INTERNAL MEDICINE

## 2021-08-03 RX ORDER — CYANOCOBALAMIN 1000 UG/ML
1000 INJECTION, SOLUTION INTRAMUSCULAR; SUBCUTANEOUS
Status: DISCONTINUED | OUTPATIENT
Start: 2021-08-03 | End: 2021-08-04 | Stop reason: HOSPADM

## 2021-08-03 RX ADMIN — CYANOCOBALAMIN 1000 MCG: 1000 INJECTION, SOLUTION INTRAMUSCULAR at 09:17

## 2021-08-17 NOTE — TELEPHONE ENCOUNTER
Pharmacy requesting refill. Pt hasn't been seen since 5/1/2019. Called pt JEYSON and kiara needs appt for refills. Also called [facility] spoke with Vanesa and kiara Rx denied pt needs appt scheduled.

## 2021-08-27 NOTE — TELEPHONE ENCOUNTER
recvd refill request and pt hasn't been seen since 5/1/2019. Called pt and LMOM to get appt scheduled.

## 2021-09-03 ENCOUNTER — HOSPITAL ENCOUNTER (OUTPATIENT)
Dept: ONCOLOGY | Facility: HOSPITAL | Age: 76
Setting detail: INFUSION SERIES
Discharge: HOME | End: 2021-09-03

## 2021-09-03 VITALS — WEIGHT: 220.8 LBS | BODY MASS INDEX: 33.46 KG/M2 | HEIGHT: 68 IN

## 2021-09-03 DIAGNOSIS — E53.8 B12 DEFICIENCY: Primary | ICD-10-CM

## 2021-09-03 PROCEDURE — 25010000002 CYANOCOBALAMIN PER 1000 MCG: Performed by: INTERNAL MEDICINE

## 2021-09-03 PROCEDURE — 96372 THER/PROPH/DIAG INJ SC/IM: CPT

## 2021-09-03 RX ORDER — CYANOCOBALAMIN 1000 UG/ML
1000 INJECTION, SOLUTION INTRAMUSCULAR; SUBCUTANEOUS
Status: DISCONTINUED | OUTPATIENT
Start: 2021-09-03 | End: 2021-09-04 | Stop reason: HOSPADM

## 2021-09-03 RX ADMIN — CYANOCOBALAMIN 1000 MCG: 1000 INJECTION, SOLUTION INTRAMUSCULAR at 09:07

## 2021-09-27 ENCOUNTER — TELEPHONE (OUTPATIENT)
Dept: CARDIOLOGY | Facility: CLINIC | Age: 76
End: 2021-09-27

## 2021-09-27 NOTE — TELEPHONE ENCOUNTER
recvd 2nd refill request for amlodipine. Called Vanesa Brizuela pharmacist and advsd pt hasn't been seen since 2019 and who pt PCP is. I have been unable to contact pt for f/u appt to be scheduled. Vanesa verbally understood.

## 2021-10-05 ENCOUNTER — HOSPITAL ENCOUNTER (OUTPATIENT)
Dept: ONCOLOGY | Facility: HOSPITAL | Age: 76
Setting detail: INFUSION SERIES
Discharge: HOME | End: 2021-10-05

## 2021-10-05 VITALS — BODY MASS INDEX: 33.49 KG/M2 | HEIGHT: 68 IN | WEIGHT: 221 LBS

## 2021-10-05 DIAGNOSIS — E53.8 B12 DEFICIENCY: Primary | ICD-10-CM

## 2021-10-05 PROCEDURE — 25010000002 CYANOCOBALAMIN PER 1000 MCG: Performed by: INTERNAL MEDICINE

## 2021-10-05 PROCEDURE — 96372 THER/PROPH/DIAG INJ SC/IM: CPT

## 2021-10-05 RX ORDER — CYANOCOBALAMIN 1000 UG/ML
1000 INJECTION, SOLUTION INTRAMUSCULAR; SUBCUTANEOUS
Status: DISCONTINUED | OUTPATIENT
Start: 2021-10-05 | End: 2021-10-06 | Stop reason: HOSPADM

## 2021-10-05 RX ADMIN — CYANOCOBALAMIN 1000 MCG: 1000 INJECTION, SOLUTION INTRAMUSCULAR at 09:09

## 2021-11-02 ENCOUNTER — HOSPITAL ENCOUNTER (OUTPATIENT)
Dept: ONCOLOGY | Facility: HOSPITAL | Age: 76
Setting detail: INFUSION SERIES
Discharge: HOME | End: 2021-11-02

## 2021-11-02 VITALS — BODY MASS INDEX: 34.22 KG/M2 | WEIGHT: 225.8 LBS | HEIGHT: 68 IN

## 2021-11-02 DIAGNOSIS — E53.8 B12 DEFICIENCY: Primary | ICD-10-CM

## 2021-11-02 PROCEDURE — 96372 THER/PROPH/DIAG INJ SC/IM: CPT

## 2021-11-02 PROCEDURE — 25010000002 CYANOCOBALAMIN PER 1000 MCG: Performed by: INTERNAL MEDICINE

## 2021-11-02 RX ORDER — CYANOCOBALAMIN 1000 UG/ML
1000 INJECTION, SOLUTION INTRAMUSCULAR; SUBCUTANEOUS
Status: DISCONTINUED | OUTPATIENT
Start: 2021-11-02 | End: 2021-11-03 | Stop reason: HOSPADM

## 2021-11-02 RX ADMIN — CYANOCOBALAMIN 1000 MCG: 1000 INJECTION, SOLUTION INTRAMUSCULAR at 08:59

## 2021-12-20 NOTE — PROGRESS NOTES
Hematology/Oncology Outpatient Follow Up    PATIENT NAME:Edgardo Jacobson  :1945  MRN: [id number]  PRIMARY CARE PHYSICIAN: Darren Mobley PA-C  REFERRING PHYSICIAN: Darren Mobley PA-C    Chief Complaint   Patient presents with   • Follow-up     B12 deficiency        HISTORY OF PRESENT ILLNESS:     This is a 75-year-old male who was a direct admit from PCP office for severe anemia.  He reported feeling poorly with worsening dyspnea and fatigue over the past six months.  His hemoglobin was found to be 7.5 at the PCP visit, previously had been over 14.  Patient denied any signs of bleeding.  Reported abdominal bloating without melena.                  Heme/Onc was consulted for macrocytic anemia on 18.  His CBC showed WBC of 3.7, hemoglobin 7.1,  and platelet count 136,000.  Vitamin B12 was noted to be low at less than 50.  Reticulocyte was 0.74.  Patient received a total of three units of packed red blood cells in the hospital and was started on replacement therapy for B12.  He denied any recent infections or contacts with the ill, but he did complain of abdominal bloating with constipation and rectal bleeding.  Patient had CT scan of the abdomen and pelvis on 18 which was essentially unremarkable, except for diverticulosis.  Patient was seen by GI and had EGD on 18.  This showed a normal esophagus, atrophic gastritis with retained food in the stomach from breakfast six hours earlier.  Normal duodenum.  Review of his labs at the time of discharge on 10/1/18, WBC was 4.7, hemoglobin 9.7, , platelet count 135,000.  Differentials were 53% neutrophils, 23% lymphocytes.  There was no monocytosis, eosinophilia or basophilia.  Chemistry panel:  BUN 11, creatinine 1.1.  Ferritin was 213.  Iron binding capacity was low at 225.  TSH normal at 3.2.  Haptoglobin low at 28.  .  Folate was more than 24.  Hemoccult stool was negative.    • 2018 - B12 injections started.    • Patient has no specific complaints since the last visit.  He remains on B12 injections.  Has discontinued alcohol ingestion.  Has not had any recent hospitalization.  He denies fevers, chills, nausea or vomiting.  • 6/8/2021: Patient here for follow up appt for vitamin b12 deficiency.    Past Medical History:   Diagnosis Date   • B12 deficiency    • BPH (benign prostatic hyperplasia)    • Hypertension        Past Surgical History:   Procedure Laterality Date   • COLONOSCOPY           Current Outpatient Medications:   •  amLODIPine (NORVASC) 5 MG tablet, Take 1 tablet by mouth Daily., Disp: 30 tablet, Rfl: 0  •  aspirin (ASPIR-LOW) 81 MG EC tablet, Daily., Disp: , Rfl:   •  Cyanocobalamin (B-12 COMPLIANCE INJECTION) 1000 MCG/ML kit, B-12 COMPLIANCE INJECTION 1000 MCG/ML KIT, Disp: , Rfl:   •  lisinopril (PRINIVIL,ZESTRIL) 20 MG tablet, Daily., Disp: , Rfl:   •  metoprolol succinate XL (TOPROL-XL) 50 MG 24 hr tablet, Daily., Disp: , Rfl:   No current facility-administered medications for this visit.    Facility-Administered Medications Ordered in Other Visits:   •  cyanocobalamin injection 1,000 mcg, 1,000 mcg, Intramuscular, Q28 Days, Hoa, Zenia Irvin MD, 1,000 mcg at 12/21/21 0907    No Known Allergies    No family history on file.    Cancer-related family history is not on file.    Social History     Tobacco Use   • Smoking status: Never Smoker   • Smokeless tobacco: Never Used   Substance Use Topics   • Alcohol use: No   • Drug use: No       I have reviewed and confirmed the accuracy of the patient's history: Chief complaint, HPI and ROS as entered by the MA/LPN/RN. Zenia Camara MD 12/21/21     SUBJECTIVE:    The patient is here for a follow up appointment.    Patient does not have any specific complaints today    REVIEW OF SYSTEMS:    Review of Systems   Constitutional: Negative for activity change, appetite change, chills, fatigue and fever.   HENT: Negative for ear pain, facial swelling,  "hearing loss, mouth sores, nosebleeds, sinus pressure, sinus pain, sore throat and trouble swallowing.    Eyes: Negative for photophobia and visual disturbance.   Respiratory: Negative for cough, chest tightness, shortness of breath, wheezing and stridor.    Cardiovascular: Negative for chest pain and palpitations.   Gastrointestinal: Negative for abdominal pain, anal bleeding, blood in stool, constipation, diarrhea, nausea and vomiting.   Endocrine: Negative for cold intolerance and heat intolerance.   Genitourinary: Negative for decreased urine volume, difficulty urinating, dysuria, hematuria, penile discharge, penile pain, scrotal swelling, testicular pain and urgency.   Musculoskeletal: Negative for back pain, gait problem, joint swelling, neck pain and neck stiffness.   Skin: Negative for color change and rash.   Allergic/Immunologic: Negative for environmental allergies and food allergies.   Neurological: Negative for dizziness, seizures, syncope, speech difficulty, weakness, numbness and headaches.   Hematological: Negative for adenopathy. Bruises/bleeds easily.        No obvious bleeding   Psychiatric/Behavioral: Negative for agitation, behavioral problems, confusion, hallucinations and sleep disturbance.   I have reviewed and confirmed the accuracy of the ROS as documented by the MA/LPN/RN Zenia Brandee Camara MD      OBJECTIVE:    Vitals:    12/21/21 0819   BP: 171/84   Pulse: 71   Resp: 20   Temp: 97.2 °F (36.2 °C)   TempSrc: Infrared   Weight: 104 kg (230 lb)   Height: 172.7 cm (68\")   PainSc: 0-No pain       ECOG  (0) Fully active, able to carry on all predisease performance without restriction    Physical Exam   Constitutional: He is oriented to person, place, and time. No distress.   HENT:   Head: Normocephalic and atraumatic.   Right Ear: Tympanic membrane, external ear and ear canal normal.   Left Ear: Tympanic membrane, external ear and ear canal normal.   Nose: Nose normal. No rhinorrhea or " congestion.   Mouth/Throat: Mucous membranes are moist. No oropharyngeal exudate or posterior oropharyngeal erythema. Oropharynx is clear.   Eyes: Pupils are equal, round, and reactive to light. Conjunctivae are normal. Right eye exhibits no discharge. Left eye exhibits no discharge. No scleral icterus.   Neck: No thyromegaly present.   Cardiovascular: Normal rate, regular rhythm and normal heart sounds. Exam reveals no gallop and no friction rub.   Pulmonary/Chest: Effort normal. No stridor. No respiratory distress. He has no wheezes.   Abdominal: Soft. Bowel sounds are normal. He exhibits no distension and no mass. There is no abdominal tenderness. There is no rebound and no guarding.   Genitourinary: Rectum:      Guaiac result negative.     Musculoskeletal: Normal range of motion. No swelling or tenderness.      Right lower leg: No edema.      Left lower leg: No edema.   Lymphadenopathy:     He has no cervical adenopathy.   Neurological: He is alert and oriented to person, place, and time. He exhibits normal muscle tone.   Skin: Skin is warm and dry. Capillary refill takes less than 2 seconds. No bruising, no lesion and no rash noted. He is not diaphoretic. No erythema. No jaundice.   Psychiatric: His behavior is normal. Mood, judgment and thought content normal.   Nursing note and vitals reviewed.    I have reexamined the patient and the results are consistent with the previously documented exam. Zenia Camara MD     RECENT LABS    WBC   Date Value Ref Range Status   12/21/2021 6.01 3.40 - 10.80 10*3/mm3 Final     RBC   Date Value Ref Range Status   12/21/2021 4.83 4.14 - 5.80 10*6/mm3 Final     Hemoglobin   Date Value Ref Range Status   12/21/2021 15.4 13.0 - 17.7 g/dL Final     Hematocrit   Date Value Ref Range Status   12/21/2021 45.7 37.5 - 51.0 % Final     MCV   Date Value Ref Range Status   12/21/2021 94.6 79.0 - 97.0 fL Final     MCH   Date Value Ref Range Status   12/21/2021 31.9 26.6 - 33.0 pg  Final     MCHC   Date Value Ref Range Status   12/21/2021 33.7 31.5 - 35.7 g/dL Final     RDW   Date Value Ref Range Status   12/21/2021 12.9 12.3 - 15.4 % Final     RDW-SD   Date Value Ref Range Status   12/21/2021 43.3 37.0 - 54.0 fl Final     MPV   Date Value Ref Range Status   12/21/2021 8.7 6.0 - 12.0 fL Final     Platelets   Date Value Ref Range Status   12/21/2021 188 140 - 450 10*3/mm3 Final     Neutrophil %   Date Value Ref Range Status   12/21/2021 53.0 42.7 - 76.0 % Final     Lymphocyte %   Date Value Ref Range Status   12/21/2021 31.4 19.6 - 45.3 % Final     Monocyte %   Date Value Ref Range Status   12/21/2021 10.3 5.0 - 12.0 % Final     Eosinophil %   Date Value Ref Range Status   12/21/2021 5.0 0.3 - 6.2 % Final     Basophil %   Date Value Ref Range Status   12/21/2021 0.3 0.0 - 1.5 % Final     Neutrophils, Absolute   Date Value Ref Range Status   12/21/2021 3.18 1.70 - 7.00 10*3/mm3 Final     Lymphocytes, Absolute   Date Value Ref Range Status   12/21/2021 1.89 0.70 - 3.10 10*3/mm3 Final     Monocytes, Absolute   Date Value Ref Range Status   12/21/2021 0.62 0.10 - 0.90 10*3/mm3 Final     Eosinophils, Absolute   Date Value Ref Range Status   12/21/2021 0.30 0.00 - 0.40 10*3/mm3 Final     Basophils, Absolute   Date Value Ref Range Status   12/21/2021 0.02 0.00 - 0.20 10*3/mm3 Final     nRBC   Date Value Ref Range Status   04/01/2019 0 0 /100[WBCs] Final       Lab Results   Component Value Date    GLUCOSE 110 (H) 04/01/2019    BUN 13 04/01/2019    CREATININE 1.0 04/01/2019    BCR 13.0 04/01/2019    K 4.4 04/01/2019    CO2 24 04/01/2019    CALCIUM 8.8 (L) 04/01/2019    ALBUMIN 3.1 (L) 09/29/2018    LABIL2 1.8 (H) 09/29/2018    AST 23 09/29/2018    ALT 15 (L) 09/29/2018         ASSESSMENT:    1. Severe B12 deficiency, on B12 replacement therapy.  He will continue the same  2. History of heavy alcohol use.  Now discontinued since 2004  3. Hypertension.   4. BPH.  5. Anemia: hemoglobin stable.    PLANS:      1. Patient will continue B12 injections.  We discussed signs and symptoms for him to monitor for and call should they are call.  He is due today and q. monthly.    2. Blood pressure elevated today, patient took medication at 830-9am.  Encouraged patient to take a blood pressure log for one week including morning and evening blood pressures then call his results to his PCP provider Dr.Joshua Mobley.    3.  Follow-up again in 6 months           I have reviewed labs results, imaging, vitals, and medications with the patient today. Will follow up in 6 months with           Patient verbalized understanding and is in agreement of the above plan.

## 2021-12-21 ENCOUNTER — HOSPITAL ENCOUNTER (OUTPATIENT)
Dept: ONCOLOGY | Facility: HOSPITAL | Age: 76
Setting detail: INFUSION SERIES
Discharge: HOME | End: 2021-12-21

## 2021-12-21 ENCOUNTER — OFFICE VISIT (OUTPATIENT)
Dept: ONCOLOGY | Facility: CLINIC | Age: 76
End: 2021-12-21

## 2021-12-21 ENCOUNTER — LAB (OUTPATIENT)
Dept: LAB | Facility: HOSPITAL | Age: 76
End: 2021-12-21

## 2021-12-21 VITALS
DIASTOLIC BLOOD PRESSURE: 84 MMHG | RESPIRATION RATE: 20 BRPM | BODY MASS INDEX: 34.86 KG/M2 | TEMPERATURE: 97.2 F | HEART RATE: 71 BPM | WEIGHT: 230 LBS | HEIGHT: 68 IN | SYSTOLIC BLOOD PRESSURE: 171 MMHG

## 2021-12-21 DIAGNOSIS — E53.8 B12 DEFICIENCY: Primary | ICD-10-CM

## 2021-12-21 PROBLEM — I10 HYPERTENSION: Status: ACTIVE | Noted: 2018-10-31

## 2021-12-21 PROBLEM — R01.1 CARDIAC MURMUR: Status: ACTIVE | Noted: 2018-10-31

## 2021-12-21 PROBLEM — E78.5 HYPERLIPIDEMIA: Status: ACTIVE | Noted: 2018-10-31

## 2021-12-21 PROBLEM — N40.0 BENIGN PROSTATIC HYPERPLASIA: Status: ACTIVE | Noted: 2018-10-31

## 2021-12-21 LAB
BASOPHILS # BLD AUTO: 0.02 10*3/MM3 (ref 0–0.2)
BASOPHILS NFR BLD AUTO: 0.3 % (ref 0–1.5)
DEPRECATED RDW RBC AUTO: 43.3 FL (ref 37–54)
EOSINOPHIL # BLD AUTO: 0.3 10*3/MM3 (ref 0–0.4)
EOSINOPHIL NFR BLD AUTO: 5 % (ref 0.3–6.2)
ERYTHROCYTE [DISTWIDTH] IN BLOOD BY AUTOMATED COUNT: 12.9 % (ref 12.3–15.4)
HCT VFR BLD AUTO: 45.7 % (ref 37.5–51)
HGB BLD-MCNC: 15.4 G/DL (ref 13–17.7)
HOLD SPECIMEN: NORMAL
HOLD SPECIMEN: NORMAL
LYMPHOCYTES # BLD AUTO: 1.89 10*3/MM3 (ref 0.7–3.1)
LYMPHOCYTES NFR BLD AUTO: 31.4 % (ref 19.6–45.3)
MCH RBC QN AUTO: 31.9 PG (ref 26.6–33)
MCHC RBC AUTO-ENTMCNC: 33.7 G/DL (ref 31.5–35.7)
MCV RBC AUTO: 94.6 FL (ref 79–97)
MONOCYTES # BLD AUTO: 0.62 10*3/MM3 (ref 0.1–0.9)
MONOCYTES NFR BLD AUTO: 10.3 % (ref 5–12)
NEUTROPHILS NFR BLD AUTO: 3.18 10*3/MM3 (ref 1.7–7)
NEUTROPHILS NFR BLD AUTO: 53 % (ref 42.7–76)
PLATELET # BLD AUTO: 188 10*3/MM3 (ref 140–450)
PMV BLD AUTO: 8.7 FL (ref 6–12)
RBC # BLD AUTO: 4.83 10*6/MM3 (ref 4.14–5.8)
WBC NRBC COR # BLD: 6.01 10*3/MM3 (ref 3.4–10.8)

## 2021-12-21 PROCEDURE — 99213 OFFICE O/P EST LOW 20 MIN: CPT | Performed by: INTERNAL MEDICINE

## 2021-12-21 PROCEDURE — 85025 COMPLETE CBC W/AUTO DIFF WBC: CPT

## 2021-12-21 PROCEDURE — 96372 THER/PROPH/DIAG INJ SC/IM: CPT

## 2021-12-21 PROCEDURE — 36415 COLL VENOUS BLD VENIPUNCTURE: CPT

## 2021-12-21 PROCEDURE — 25010000002 CYANOCOBALAMIN PER 1000 MCG: Performed by: INTERNAL MEDICINE

## 2021-12-21 RX ORDER — CYANOCOBALAMIN 1000 UG/ML
1000 INJECTION, SOLUTION INTRAMUSCULAR; SUBCUTANEOUS
Status: DISCONTINUED | OUTPATIENT
Start: 2021-12-21 | End: 2021-12-22 | Stop reason: HOSPADM

## 2021-12-21 RX ADMIN — CYANOCOBALAMIN 1000 MCG: 1000 INJECTION, SOLUTION INTRAMUSCULAR at 09:07

## 2022-01-24 ENCOUNTER — HOSPITAL ENCOUNTER (OUTPATIENT)
Dept: ONCOLOGY | Facility: HOSPITAL | Age: 77
Setting detail: INFUSION SERIES
Discharge: HOME | End: 2022-01-24

## 2022-01-24 VITALS — WEIGHT: 229.8 LBS | HEIGHT: 68 IN | BODY MASS INDEX: 34.83 KG/M2

## 2022-01-24 DIAGNOSIS — E53.8 B12 DEFICIENCY: Primary | ICD-10-CM

## 2022-01-24 PROCEDURE — 96372 THER/PROPH/DIAG INJ SC/IM: CPT

## 2022-01-24 PROCEDURE — 25010000002 CYANOCOBALAMIN PER 1000 MCG: Performed by: INTERNAL MEDICINE

## 2022-01-24 RX ORDER — CYANOCOBALAMIN 1000 UG/ML
1000 INJECTION, SOLUTION INTRAMUSCULAR; SUBCUTANEOUS
Status: DISCONTINUED | OUTPATIENT
Start: 2022-01-24 | End: 2022-01-25 | Stop reason: HOSPADM

## 2022-01-24 RX ADMIN — CYANOCOBALAMIN 1000 MCG: 1000 INJECTION INTRAMUSCULAR; SUBCUTANEOUS at 09:41

## 2022-02-28 ENCOUNTER — HOSPITAL ENCOUNTER (OUTPATIENT)
Dept: ONCOLOGY | Facility: HOSPITAL | Age: 77
Setting detail: INFUSION SERIES
Discharge: HOME | End: 2022-02-28

## 2022-02-28 VITALS — BODY MASS INDEX: 35.25 KG/M2 | WEIGHT: 232.6 LBS | HEIGHT: 68 IN

## 2022-02-28 DIAGNOSIS — E53.8 B12 DEFICIENCY: Primary | ICD-10-CM

## 2022-02-28 PROCEDURE — 25010000002 CYANOCOBALAMIN PER 1000 MCG: Performed by: INTERNAL MEDICINE

## 2022-02-28 PROCEDURE — 96372 THER/PROPH/DIAG INJ SC/IM: CPT

## 2022-02-28 RX ORDER — CYANOCOBALAMIN 1000 UG/ML
1000 INJECTION, SOLUTION INTRAMUSCULAR; SUBCUTANEOUS
Status: DISCONTINUED | OUTPATIENT
Start: 2022-02-28 | End: 2022-03-01 | Stop reason: HOSPADM

## 2022-02-28 RX ADMIN — CYANOCOBALAMIN 1000 MCG: 1000 INJECTION, SOLUTION INTRAMUSCULAR at 10:19

## 2022-03-28 ENCOUNTER — HOSPITAL ENCOUNTER (OUTPATIENT)
Dept: ONCOLOGY | Facility: HOSPITAL | Age: 77
Setting detail: INFUSION SERIES
Discharge: HOME | End: 2022-03-28

## 2022-03-28 VITALS — WEIGHT: 229.2 LBS | HEIGHT: 68 IN | BODY MASS INDEX: 34.74 KG/M2

## 2022-03-28 DIAGNOSIS — E53.8 B12 DEFICIENCY: Primary | ICD-10-CM

## 2022-03-28 PROCEDURE — 25010000002 CYANOCOBALAMIN PER 1000 MCG: Performed by: INTERNAL MEDICINE

## 2022-03-28 PROCEDURE — 96372 THER/PROPH/DIAG INJ SC/IM: CPT

## 2022-03-28 RX ORDER — CYANOCOBALAMIN 1000 UG/ML
1000 INJECTION, SOLUTION INTRAMUSCULAR; SUBCUTANEOUS
Status: DISCONTINUED | OUTPATIENT
Start: 2022-03-28 | End: 2022-03-29 | Stop reason: HOSPADM

## 2022-03-28 RX ADMIN — CYANOCOBALAMIN 1000 MCG: 1000 INJECTION INTRAMUSCULAR; SUBCUTANEOUS at 09:56

## 2022-04-29 ENCOUNTER — HOSPITAL ENCOUNTER (OUTPATIENT)
Dept: ONCOLOGY | Facility: HOSPITAL | Age: 77
Setting detail: INFUSION SERIES
Discharge: HOME | End: 2022-04-29

## 2022-04-29 VITALS — BODY MASS INDEX: 35.52 KG/M2 | WEIGHT: 234.4 LBS | HEIGHT: 68 IN

## 2022-04-29 DIAGNOSIS — E53.8 B12 DEFICIENCY: Primary | ICD-10-CM

## 2022-04-29 PROCEDURE — 96372 THER/PROPH/DIAG INJ SC/IM: CPT

## 2022-04-29 PROCEDURE — 25010000002 CYANOCOBALAMIN PER 1000 MCG: Performed by: INTERNAL MEDICINE

## 2022-04-29 RX ORDER — CYANOCOBALAMIN 1000 UG/ML
1000 INJECTION, SOLUTION INTRAMUSCULAR; SUBCUTANEOUS
Status: DISCONTINUED | OUTPATIENT
Start: 2022-04-29 | End: 2022-04-30 | Stop reason: HOSPADM

## 2022-04-29 RX ADMIN — CYANOCOBALAMIN 1000 MCG: 1000 INJECTION, SOLUTION INTRAMUSCULAR at 09:26

## 2022-05-27 ENCOUNTER — HOSPITAL ENCOUNTER (OUTPATIENT)
Dept: ONCOLOGY | Facility: HOSPITAL | Age: 77
Setting detail: INFUSION SERIES
Discharge: HOME | End: 2022-05-27

## 2022-05-27 VITALS — WEIGHT: 239 LBS | HEIGHT: 68 IN | BODY MASS INDEX: 36.22 KG/M2

## 2022-05-27 DIAGNOSIS — E53.8 B12 DEFICIENCY: Primary | ICD-10-CM

## 2022-05-27 PROCEDURE — 96372 THER/PROPH/DIAG INJ SC/IM: CPT

## 2022-05-27 PROCEDURE — 25010000002 CYANOCOBALAMIN PER 1000 MCG: Performed by: INTERNAL MEDICINE

## 2022-05-27 RX ORDER — CYANOCOBALAMIN 1000 UG/ML
1000 INJECTION, SOLUTION INTRAMUSCULAR; SUBCUTANEOUS
Status: DISCONTINUED | OUTPATIENT
Start: 2022-05-27 | End: 2022-05-28 | Stop reason: HOSPADM

## 2022-05-27 RX ADMIN — CYANOCOBALAMIN 1000 MCG: 1000 INJECTION, SOLUTION INTRAMUSCULAR at 09:51

## 2022-06-20 ENCOUNTER — OFFICE VISIT (OUTPATIENT)
Dept: ONCOLOGY | Facility: CLINIC | Age: 77
End: 2022-06-20

## 2022-06-20 ENCOUNTER — HOSPITAL ENCOUNTER (OUTPATIENT)
Dept: ONCOLOGY | Facility: HOSPITAL | Age: 77
Setting detail: INFUSION SERIES
Discharge: HOME | End: 2022-06-20

## 2022-06-20 ENCOUNTER — LAB (OUTPATIENT)
Dept: LAB | Facility: HOSPITAL | Age: 77
End: 2022-06-20

## 2022-06-20 VITALS
BODY MASS INDEX: 35.28 KG/M2 | WEIGHT: 232.8 LBS | TEMPERATURE: 96.9 F | HEART RATE: 70 BPM | HEIGHT: 68 IN | RESPIRATION RATE: 18 BRPM | SYSTOLIC BLOOD PRESSURE: 171 MMHG | OXYGEN SATURATION: 94 % | DIASTOLIC BLOOD PRESSURE: 85 MMHG

## 2022-06-20 VITALS — HEIGHT: 68 IN | BODY MASS INDEX: 35.28 KG/M2 | WEIGHT: 232.8 LBS

## 2022-06-20 DIAGNOSIS — E53.8 B12 DEFICIENCY: Primary | ICD-10-CM

## 2022-06-20 LAB
BASOPHILS # BLD AUTO: 0.01 10*3/MM3 (ref 0–0.2)
BASOPHILS NFR BLD AUTO: 0.2 % (ref 0–1.5)
DEPRECATED RDW RBC AUTO: 43 FL (ref 37–54)
EOSINOPHIL # BLD AUTO: 0.24 10*3/MM3 (ref 0–0.4)
EOSINOPHIL NFR BLD AUTO: 3.6 % (ref 0.3–6.2)
ERYTHROCYTE [DISTWIDTH] IN BLOOD BY AUTOMATED COUNT: 12.7 % (ref 12.3–15.4)
HCT VFR BLD AUTO: 46.8 % (ref 37.5–51)
HGB BLD-MCNC: 16.2 G/DL (ref 13–17.7)
HOLD SPECIMEN: NORMAL
HOLD SPECIMEN: NORMAL
LYMPHOCYTES # BLD AUTO: 1.98 10*3/MM3 (ref 0.7–3.1)
LYMPHOCYTES NFR BLD AUTO: 30 % (ref 19.6–45.3)
MCH RBC QN AUTO: 32.7 PG (ref 26.6–33)
MCHC RBC AUTO-ENTMCNC: 34.6 G/DL (ref 31.5–35.7)
MCV RBC AUTO: 94.5 FL (ref 79–97)
MONOCYTES # BLD AUTO: 0.64 10*3/MM3 (ref 0.1–0.9)
MONOCYTES NFR BLD AUTO: 9.7 % (ref 5–12)
NEUTROPHILS NFR BLD AUTO: 3.72 10*3/MM3 (ref 1.7–7)
NEUTROPHILS NFR BLD AUTO: 56.5 % (ref 42.7–76)
PLATELET # BLD AUTO: 177 10*3/MM3 (ref 140–450)
PMV BLD AUTO: 9.1 FL (ref 6–12)
RBC # BLD AUTO: 4.95 10*6/MM3 (ref 4.14–5.8)
WBC NRBC COR # BLD: 6.59 10*3/MM3 (ref 3.4–10.8)

## 2022-06-20 PROCEDURE — 99213 OFFICE O/P EST LOW 20 MIN: CPT | Performed by: INTERNAL MEDICINE

## 2022-06-20 PROCEDURE — 85025 COMPLETE CBC W/AUTO DIFF WBC: CPT

## 2022-06-20 PROCEDURE — 36415 COLL VENOUS BLD VENIPUNCTURE: CPT

## 2022-06-20 PROCEDURE — 96372 THER/PROPH/DIAG INJ SC/IM: CPT

## 2022-06-20 PROCEDURE — 25010000002 CYANOCOBALAMIN PER 1000 MCG: Performed by: INTERNAL MEDICINE

## 2022-06-20 RX ORDER — CYANOCOBALAMIN 1000 UG/ML
1000 INJECTION, SOLUTION INTRAMUSCULAR; SUBCUTANEOUS
Status: DISCONTINUED | OUTPATIENT
Start: 2022-06-20 | End: 2022-06-21 | Stop reason: HOSPADM

## 2022-06-20 RX ADMIN — CYANOCOBALAMIN 1000 MCG: 1000 INJECTION, SOLUTION INTRAMUSCULAR at 10:08

## 2022-06-20 NOTE — PROGRESS NOTES
Hematology/Oncology Outpatient Follow Up    PATIENT NAME:Edgardo Jacobson  :1945  MRN: [id number]  PRIMARY CARE PHYSICIAN: Darren Mobley PA-C  REFERRING PHYSICIAN: Darren Mobley PA-C    Chief Complaint   Patient presents with   • Follow-up     B12 deficiency        HISTORY OF PRESENT ILLNESS:     This is a 75-year-old male who was a direct admit from PCP office for severe anemia.  He reported feeling poorly with worsening dyspnea and fatigue over the past six months.  His hemoglobin was found to be 7.5 at the PCP visit, previously had been over 14.  Patient denied any signs of bleeding.  Reported abdominal bloating without melena.                  Heme/Onc was consulted for macrocytic anemia on 18.  His CBC showed WBC of 3.7, hemoglobin 7.1,  and platelet count 136,000.  Vitamin B12 was noted to be low at less than 50.  Reticulocyte was 0.74.  Patient received a total of three units of packed red blood cells in the hospital and was started on replacement therapy for B12.  He denied any recent infections or contacts with the ill, but he did complain of abdominal bloating with constipation and rectal bleeding.  Patient had CT scan of the abdomen and pelvis on 18 which was essentially unremarkable, except for diverticulosis.  Patient was seen by GI and had EGD on 18.  This showed a normal esophagus, atrophic gastritis with retained food in the stomach from breakfast six hours earlier.  Normal duodenum.  Review of his labs at the time of discharge on 10/1/18, WBC was 4.7, hemoglobin 9.7, , platelet count 135,000.  Differentials were 53% neutrophils, 23% lymphocytes.  There was no monocytosis, eosinophilia or basophilia.  Chemistry panel:  BUN 11, creatinine 1.1.  Ferritin was 213.  Iron binding capacity was low at 225.  TSH normal at 3.2.  Haptoglobin low at 28.  .  Folate was more than 24.  Hemoccult stool was negative.    • 2018 - B12 injections started.    • Patient has no specific complaints since the last visit.  He remains on B12 injections.  Has discontinued alcohol ingestion.  Has not had any recent hospitalization.  He denies fevers, chills, nausea or vomiting.  • 6/8/2021: Patient here for follow up appt for vitamin b12 deficiency.    Past Medical History:   Diagnosis Date   • B12 deficiency    • BPH (benign prostatic hyperplasia)    • Hypertension        Past Surgical History:   Procedure Laterality Date   • COLONOSCOPY           Current Outpatient Medications:   •  amLODIPine (NORVASC) 5 MG tablet, Take 1 tablet by mouth Daily., Disp: 30 tablet, Rfl: 0  •  aspirin (aspirin) 81 MG EC tablet, Daily., Disp: , Rfl:   •  Cyanocobalamin 1000 MCG/ML kit, B-12 COMPLIANCE INJECTION 1000 MCG/ML KIT, Disp: , Rfl:   •  lisinopril (PRINIVIL,ZESTRIL) 20 MG tablet, Daily., Disp: , Rfl:   •  metoprolol succinate XL (TOPROL-XL) 50 MG 24 hr tablet, Daily., Disp: , Rfl:   No current facility-administered medications for this visit.    Facility-Administered Medications Ordered in Other Visits:   •  cyanocobalamin injection 1,000 mcg, 1,000 mcg, Intramuscular, Q28 Days, Zenia Camara MD    No Known Allergies    No family history on file.    Cancer-related family history is not on file.    Social History     Tobacco Use   • Smoking status: Never Smoker   • Smokeless tobacco: Never Used   Substance Use Topics   • Alcohol use: No   • Drug use: No       I have reviewed and confirmed the accuracy of the patient's history: Chief complaint, HPI and ROS as entered by the MA/LPN/RN. Zenia Camara MD 06/20/22     SUBJECTIVE:    Patient denies any new issues today. He is doing well.        REVIEW OF SYSTEMS:    Review of Systems   Constitutional: Negative for activity change, appetite change, chills, fatigue and fever.   HENT: Negative for ear pain, facial swelling, hearing loss, mouth sores, nosebleeds, sinus pressure, sinus pain, sore throat and trouble swallowing.   "  Eyes: Negative for photophobia and visual disturbance.   Respiratory: Negative for cough, chest tightness, shortness of breath, wheezing and stridor.    Cardiovascular: Negative for chest pain and palpitations.   Gastrointestinal: Negative for abdominal pain, anal bleeding, blood in stool, constipation, diarrhea, nausea and vomiting.   Endocrine: Negative for cold intolerance and heat intolerance.   Genitourinary: Negative for decreased urine volume, difficulty urinating, dysuria, hematuria, penile discharge, penile pain, scrotal swelling, testicular pain and urgency.   Musculoskeletal: Negative for back pain, gait problem, joint swelling, neck pain and neck stiffness.   Skin: Negative for color change and rash.   Allergic/Immunologic: Negative for environmental allergies and food allergies.   Neurological: Negative for dizziness, seizures, syncope, speech difficulty, weakness, numbness and headaches.   Hematological: Negative for adenopathy. Bruises/bleeds easily.        No obvious bleeding   Psychiatric/Behavioral: Negative for agitation, behavioral problems, confusion, hallucinations and sleep disturbance.     I have reviewed and confirmed the accuracy of the ROS as documented by the MA/LPN/RN Zenia Brandee Camara MD      OBJECTIVE:    Vitals:    06/20/22 0850   BP: 171/85   Pulse: 70   Resp: 18   Temp: 96.9 °F (36.1 °C)   SpO2: 94%   Weight: 106 kg (232 lb 12.8 oz)   Height: 172.7 cm (68\")   PainSc: 0-No pain       ECOG    (0) Fully active, able to carry on all predisease performance without restriction    Physical Exam   Constitutional: He is oriented to person, place, and time. No distress.   HENT:   Head: Normocephalic and atraumatic.   Right Ear: Tympanic membrane, external ear and ear canal normal.   Left Ear: Tympanic membrane, external ear and ear canal normal.   Nose: Nose normal. No rhinorrhea or congestion.   Mouth/Throat: Mucous membranes are moist. No oropharyngeal exudate or posterior " oropharyngeal erythema. Oropharynx is clear.   Eyes: Pupils are equal, round, and reactive to light. Conjunctivae are normal. Right eye exhibits no discharge. Left eye exhibits no discharge. No scleral icterus.   Neck: No thyromegaly present.   Cardiovascular: Normal rate, regular rhythm and normal heart sounds. Exam reveals no gallop and no friction rub.   Pulmonary/Chest: Effort normal. No stridor. No respiratory distress. He has no wheezes.   Abdominal: Soft. Bowel sounds are normal. He exhibits no distension and no mass. There is no abdominal tenderness. There is no rebound and no guarding.   Genitourinary: Rectum:      Guaiac result negative.     Musculoskeletal: Normal range of motion. No swelling or tenderness.      Right lower leg: No edema.      Left lower leg: No edema.   Lymphadenopathy:     He has no cervical adenopathy.   Neurological: He is alert and oriented to person, place, and time. He exhibits normal muscle tone.   Skin: Skin is warm and dry. Capillary refill takes less than 2 seconds. No bruising, no lesion and no rash noted. He is not diaphoretic. No erythema. No jaundice.   Psychiatric: His behavior is normal. Mood, judgment and thought content normal.   Nursing note and vitals reviewed.    I have reexamined the patient and the results are consistent with the previously documented exam. Zenia Camara MD     RECENT LABS    WBC   Date Value Ref Range Status   06/20/2022 6.59 3.40 - 10.80 10*3/mm3 Final     RBC   Date Value Ref Range Status   06/20/2022 4.95 4.14 - 5.80 10*6/mm3 Final     Hemoglobin   Date Value Ref Range Status   06/20/2022 16.2 13.0 - 17.7 g/dL Final     Hematocrit   Date Value Ref Range Status   06/20/2022 46.8 37.5 - 51.0 % Final     MCV   Date Value Ref Range Status   06/20/2022 94.5 79.0 - 97.0 fL Final     MCH   Date Value Ref Range Status   06/20/2022 32.7 26.6 - 33.0 pg Final     MCHC   Date Value Ref Range Status   06/20/2022 34.6 31.5 - 35.7 g/dL Final     RDW    Date Value Ref Range Status   06/20/2022 12.7 12.3 - 15.4 % Final     RDW-SD   Date Value Ref Range Status   06/20/2022 43.0 37.0 - 54.0 fl Final     MPV   Date Value Ref Range Status   06/20/2022 9.1 6.0 - 12.0 fL Final     Platelets   Date Value Ref Range Status   06/20/2022 177 140 - 450 10*3/mm3 Final     Neutrophil %   Date Value Ref Range Status   06/20/2022 56.5 42.7 - 76.0 % Final     Lymphocyte %   Date Value Ref Range Status   06/20/2022 30.0 19.6 - 45.3 % Final     Monocyte %   Date Value Ref Range Status   06/20/2022 9.7 5.0 - 12.0 % Final     Eosinophil %   Date Value Ref Range Status   06/20/2022 3.6 0.3 - 6.2 % Final     Basophil %   Date Value Ref Range Status   06/20/2022 0.2 0.0 - 1.5 % Final     Neutrophils, Absolute   Date Value Ref Range Status   06/20/2022 3.72 1.70 - 7.00 10*3/mm3 Final     Lymphocytes, Absolute   Date Value Ref Range Status   06/20/2022 1.98 0.70 - 3.10 10*3/mm3 Final     Monocytes, Absolute   Date Value Ref Range Status   06/20/2022 0.64 0.10 - 0.90 10*3/mm3 Final     Eosinophils, Absolute   Date Value Ref Range Status   06/20/2022 0.24 0.00 - 0.40 10*3/mm3 Final     Basophils, Absolute   Date Value Ref Range Status   06/20/2022 0.01 0.00 - 0.20 10*3/mm3 Final     nRBC   Date Value Ref Range Status   04/01/2019 0 0 /100[WBCs] Final       Lab Results   Component Value Date    GLUCOSE 110 (H) 04/01/2019    BUN 13 04/01/2019    CREATININE 1.0 04/01/2019    BCR 13.0 04/01/2019    K 4.4 04/01/2019    CO2 24 04/01/2019    CALCIUM 8.8 (L) 04/01/2019    ALBUMIN 3.1 (L) 09/29/2018    LABIL2 1.8 (H) 09/29/2018    AST 23 09/29/2018    ALT 15 (L) 09/29/2018         ASSESSMENT:    1. Severe B12 deficiency, on B12 replacement therapy. He will continue B12 replacement therapy.  2. History of heavy alcohol use.  Now discontinued since 2004  3. Hypertension.   4. BPH.  5. Anemia: hemoglobin is stable.    PLANS:     1. Patient will continue B12 injections.  We discussed signs and symptoms  for him to monitor for and call should they are call.   He will receive B12 injection today    2. Blood pressure elevated today  3.  Follow-up again in 6 months or earlier prn problems.                Patient verbalized understanding and is in agreement of the above plan.

## 2022-07-19 ENCOUNTER — HOSPITAL ENCOUNTER (OUTPATIENT)
Dept: ONCOLOGY | Facility: HOSPITAL | Age: 77
Setting detail: INFUSION SERIES
Discharge: HOME | End: 2022-07-19

## 2022-07-19 VITALS
BODY MASS INDEX: 35.8 KG/M2 | DIASTOLIC BLOOD PRESSURE: 68 MMHG | SYSTOLIC BLOOD PRESSURE: 161 MMHG | WEIGHT: 236.2 LBS | HEIGHT: 68 IN | TEMPERATURE: 96.9 F | RESPIRATION RATE: 22 BRPM | HEART RATE: 71 BPM

## 2022-07-19 DIAGNOSIS — E53.8 B12 DEFICIENCY: Primary | ICD-10-CM

## 2022-07-19 PROCEDURE — 96372 THER/PROPH/DIAG INJ SC/IM: CPT

## 2022-07-19 PROCEDURE — 25010000002 CYANOCOBALAMIN PER 1000 MCG: Performed by: INTERNAL MEDICINE

## 2022-07-19 RX ORDER — CYANOCOBALAMIN 1000 UG/ML
1000 INJECTION, SOLUTION INTRAMUSCULAR; SUBCUTANEOUS
Status: DISCONTINUED | OUTPATIENT
Start: 2022-07-19 | End: 2022-07-20 | Stop reason: HOSPADM

## 2022-07-19 RX ADMIN — CYANOCOBALAMIN 1000 MCG: 1000 INJECTION, SOLUTION INTRAMUSCULAR at 07:59

## 2022-08-17 ENCOUNTER — HOSPITAL ENCOUNTER (OUTPATIENT)
Dept: ONCOLOGY | Facility: HOSPITAL | Age: 77
Setting detail: INFUSION SERIES
Discharge: HOME | End: 2022-08-17

## 2022-08-17 DIAGNOSIS — E53.8 B12 DEFICIENCY: Primary | ICD-10-CM

## 2022-08-17 PROCEDURE — 96372 THER/PROPH/DIAG INJ SC/IM: CPT

## 2022-08-17 PROCEDURE — 25010000002 CYANOCOBALAMIN PER 1000 MCG: Performed by: INTERNAL MEDICINE

## 2022-08-17 RX ORDER — CYANOCOBALAMIN 1000 UG/ML
1000 INJECTION, SOLUTION INTRAMUSCULAR; SUBCUTANEOUS
Status: DISCONTINUED | OUTPATIENT
Start: 2022-08-17 | End: 2022-08-18 | Stop reason: HOSPADM

## 2022-08-17 RX ADMIN — CYANOCOBALAMIN 1000 MCG: 1000 INJECTION, SOLUTION INTRAMUSCULAR at 08:24

## 2022-09-14 ENCOUNTER — HOSPITAL ENCOUNTER (OUTPATIENT)
Dept: ONCOLOGY | Facility: HOSPITAL | Age: 77
Setting detail: INFUSION SERIES
Discharge: HOME | End: 2022-09-14

## 2022-09-14 DIAGNOSIS — E53.8 B12 DEFICIENCY: Primary | ICD-10-CM

## 2022-09-14 PROCEDURE — 25010000002 CYANOCOBALAMIN PER 1000 MCG: Performed by: INTERNAL MEDICINE

## 2022-09-14 PROCEDURE — 96372 THER/PROPH/DIAG INJ SC/IM: CPT

## 2022-09-14 RX ORDER — CYANOCOBALAMIN 1000 UG/ML
1000 INJECTION, SOLUTION INTRAMUSCULAR; SUBCUTANEOUS
Status: DISCONTINUED | OUTPATIENT
Start: 2022-09-14 | End: 2022-09-15 | Stop reason: HOSPADM

## 2022-09-14 RX ADMIN — CYANOCOBALAMIN 1000 MCG: 1000 INJECTION, SOLUTION INTRAMUSCULAR at 08:18

## 2022-10-12 ENCOUNTER — HOSPITAL ENCOUNTER (OUTPATIENT)
Dept: ONCOLOGY | Facility: HOSPITAL | Age: 77
Setting detail: INFUSION SERIES
Discharge: HOME | End: 2022-10-12

## 2022-10-12 DIAGNOSIS — E53.8 B12 DEFICIENCY: Primary | ICD-10-CM

## 2022-10-12 PROCEDURE — 25010000002 CYANOCOBALAMIN PER 1000 MCG: Performed by: INTERNAL MEDICINE

## 2022-10-12 PROCEDURE — 96372 THER/PROPH/DIAG INJ SC/IM: CPT

## 2022-10-12 RX ORDER — CYANOCOBALAMIN 1000 UG/ML
1000 INJECTION, SOLUTION INTRAMUSCULAR; SUBCUTANEOUS
Status: DISCONTINUED | OUTPATIENT
Start: 2022-10-12 | End: 2022-10-13 | Stop reason: HOSPADM

## 2022-10-12 RX ADMIN — CYANOCOBALAMIN 1000 MCG: 1000 INJECTION INTRAMUSCULAR; SUBCUTANEOUS at 08:25

## 2022-11-09 ENCOUNTER — HOSPITAL ENCOUNTER (OUTPATIENT)
Dept: ONCOLOGY | Facility: HOSPITAL | Age: 77
Setting detail: INFUSION SERIES
Discharge: HOME | End: 2022-11-09

## 2022-11-09 DIAGNOSIS — E53.8 B12 DEFICIENCY: Primary | ICD-10-CM

## 2022-11-09 PROCEDURE — 25010000002 CYANOCOBALAMIN PER 1000 MCG: Performed by: INTERNAL MEDICINE

## 2022-11-09 PROCEDURE — 96372 THER/PROPH/DIAG INJ SC/IM: CPT

## 2022-11-09 RX ORDER — CYANOCOBALAMIN 1000 UG/ML
1000 INJECTION, SOLUTION INTRAMUSCULAR; SUBCUTANEOUS
Status: DISCONTINUED | OUTPATIENT
Start: 2022-11-09 | End: 2022-11-10 | Stop reason: HOSPADM

## 2022-11-09 RX ADMIN — CYANOCOBALAMIN 1000 MCG: 1000 INJECTION, SOLUTION INTRAMUSCULAR at 08:14

## 2022-12-19 NOTE — PROGRESS NOTES
Hematology/Oncology Outpatient Follow Up    PATIENT NAME:Edgardo Jacobson  :1945  MRN: [id number]  PRIMARY CARE PHYSICIAN: Darren Mobley PA-C  REFERRING PHYSICIAN: Darren Mobley PA-C    Chief Complaint   Patient presents with   • Follow-up     B12 deficiency        HISTORY OF PRESENT ILLNESS:     This is a 75-year-old male who was a direct admit from PCP office for severe anemia.  He reported feeling poorly with worsening dyspnea and fatigue over the past six months.  His hemoglobin was found to be 7.5 at the PCP visit, previously had been over 14.  Patient denied any signs of bleeding.  Reported abdominal bloating without melena.                  Heme/Onc was consulted for macrocytic anemia on 18.  His CBC showed WBC of 3.7, hemoglobin 7.1,  and platelet count 136,000.  Vitamin B12 was noted to be low at less than 50.  Reticulocyte was 0.74.  Patient received a total of three units of packed red blood cells in the hospital and was started on replacement therapy for B12.  He denied any recent infections or contacts with the ill, but he did complain of abdominal bloating with constipation and rectal bleeding.  Patient had CT scan of the abdomen and pelvis on 18 which was essentially unremarkable, except for diverticulosis.  Patient was seen by GI and had EGD on 18.  This showed a normal esophagus, atrophic gastritis with retained food in the stomach from breakfast six hours earlier.  Normal duodenum.  Review of his labs at the time of discharge on 10/1/18, WBC was 4.7, hemoglobin 9.7, , platelet count 135,000.  Differentials were 53% neutrophils, 23% lymphocytes.  There was no monocytosis, eosinophilia or basophilia.  Chemistry panel:  BUN 11, creatinine 1.1.  Ferritin was 213.  Iron binding capacity was low at 225.  TSH normal at 3.2.  Haptoglobin low at 28.  .  Folate was more than 24.  Hemoccult stool was negative.    • 2018 - B12 injections started.    • Patient has no specific complaints since the last visit.  He remains on B12 injections.  Has discontinued alcohol ingestion.  Has not had any recent hospitalization.  He denies fevers, chills, nausea or vomiting.  • 6/8/2021: Patient here for follow up appt for vitamin b12 deficiency.    Past Medical History:   Diagnosis Date   • B12 deficiency    • BPH (benign prostatic hyperplasia)    • Hypertension        Past Surgical History:   Procedure Laterality Date   • COLONOSCOPY           Current Outpatient Medications:   •  amLODIPine (NORVASC) 5 MG tablet, Take 1 tablet by mouth Daily., Disp: 30 tablet, Rfl: 0  •  aspirin (aspirin) 81 MG EC tablet, Daily., Disp: , Rfl:   •  Cyanocobalamin 1000 MCG/ML kit, B-12 COMPLIANCE INJECTION 1000 MCG/ML KIT, Disp: , Rfl:   •  lisinopril (PRINIVIL,ZESTRIL) 20 MG tablet, Daily., Disp: , Rfl:   •  metoprolol succinate XL (TOPROL-XL) 50 MG 24 hr tablet, Daily., Disp: , Rfl:     No Known Allergies    No family history on file.    Cancer-related family history is not on file.    Social History     Tobacco Use   • Smoking status: Never   • Smokeless tobacco: Never   Substance Use Topics   • Alcohol use: No   • Drug use: No       I have reviewed and confirmed the accuracy of the patient's history: Chief complaint, HPI and ROS as entered by the MA/LPN/RN. Kiera Silverman, APRN 12/20/22     SUBJECTIVE:  Patient is here for his routine follow-up for vitamin B12 deficiency.  He reports he tolerates his vitamin B12 injections well.  He has no reports of fatigue or neuropathies.  He states he is doing well overall.    REVIEW OF SYSTEMS:    Review of Systems   Constitutional: Negative for activity change, appetite change, chills, fatigue and fever.   HENT: Negative for ear pain, facial swelling, hearing loss, mouth sores, nosebleeds, sinus pressure, sinus pain, sore throat and trouble swallowing.    Eyes: Negative for photophobia and visual disturbance.   Respiratory: Negative for  "cough, chest tightness, shortness of breath, wheezing and stridor.    Cardiovascular: Negative for chest pain and palpitations.   Gastrointestinal: Negative for abdominal pain, anal bleeding, blood in stool, constipation, diarrhea, nausea and vomiting.   Endocrine: Negative for cold intolerance and heat intolerance.   Genitourinary: Negative for decreased urine volume, difficulty urinating, dysuria, hematuria, penile discharge, penile pain, scrotal swelling, testicular pain and urgency.   Musculoskeletal: Negative for back pain, gait problem, joint swelling, neck pain and neck stiffness.   Skin: Negative for color change and rash.   Allergic/Immunologic: Negative for environmental allergies and food allergies.   Neurological: Negative for dizziness, seizures, syncope, speech difficulty, weakness, numbness and headaches.   Hematological: Negative for adenopathy.        No obvious bleeding   Psychiatric/Behavioral: Negative for agitation, behavioral problems, confusion, hallucinations and sleep disturbance.     I have reviewed and confirmed the accuracy of the ROS as documented by the MA/LPN/RN MILTON Orozco      OBJECTIVE:    Vitals:    12/20/22 0902   BP: 145/70   Pulse: 82   Resp: 16   Temp: 97.8 °F (36.6 °C)   SpO2: 95%   Weight: 106 kg (234 lb)   Height: 172.7 cm (68\")   PainSc: 0-No pain       ECOG    (0) Fully active, able to carry on all predisease performance without restriction    Physical Exam   Constitutional: He is oriented to person, place, and time. No distress.   HENT:   Head: Normocephalic and atraumatic.   Right Ear: External ear normal.   Left Ear: External ear normal.   Nose: Nose normal. No rhinorrhea or congestion.   Mouth/Throat: Mucous membranes are moist. No oropharyngeal exudate or posterior oropharyngeal erythema. Oropharynx is clear.   Eyes: Pupils are equal, round, and reactive to light. Conjunctivae are normal. Right eye exhibits no discharge. Left eye exhibits no discharge. " No scleral icterus.   Neck: No thyromegaly present.   Cardiovascular: Normal rate, regular rhythm and normal heart sounds. Exam reveals no gallop and no friction rub.   Pulmonary/Chest: Effort normal. No stridor. No respiratory distress. He has no wheezes.   Abdominal: Soft. Bowel sounds are normal. He exhibits no distension and no mass. There is no abdominal tenderness. There is no rebound and no guarding.   Musculoskeletal: Normal range of motion. No swelling or tenderness.      Right lower leg: No edema.      Left lower leg: No edema.   Lymphadenopathy:     He has no cervical adenopathy.   Neurological: He is alert and oriented to person, place, and time. He exhibits normal muscle tone.   Skin: Skin is warm and dry. Capillary refill takes less than 2 seconds. No bruising, no lesion and no rash noted. He is not diaphoretic. No erythema. No jaundice.   Psychiatric: His behavior is normal. Mood, judgment and thought content normal.   Nursing note and vitals reviewed.    I have reexamined the patient and the results are consistent with the previously documented exam. Kiera Silverman, APRN     RECENT LABS    WBC   Date Value Ref Range Status   12/20/2022 9.75 3.40 - 10.80 10*3/mm3 Final     RBC   Date Value Ref Range Status   12/20/2022 4.85 4.14 - 5.80 10*6/mm3 Final     Hemoglobin   Date Value Ref Range Status   12/20/2022 15.8 13.0 - 17.7 g/dL Final     Hematocrit   Date Value Ref Range Status   12/20/2022 46.7 37.5 - 51.0 % Final     MCV   Date Value Ref Range Status   12/20/2022 96.3 79.0 - 97.0 fL Final     MCH   Date Value Ref Range Status   12/20/2022 32.6 26.6 - 33.0 pg Final     MCHC   Date Value Ref Range Status   12/20/2022 33.8 31.5 - 35.7 g/dL Final     RDW   Date Value Ref Range Status   12/20/2022 13.1 12.3 - 15.4 % Final     RDW-SD   Date Value Ref Range Status   12/20/2022 45.4 37.0 - 54.0 fl Final     MPV   Date Value Ref Range Status   12/20/2022 9.1 6.0 - 12.0 fL Final     Platelets   Date  Value Ref Range Status   12/20/2022 182 140 - 450 10*3/mm3 Final     Neutrophil %   Date Value Ref Range Status   12/20/2022 64.4 42.7 - 76.0 % Final     Lymphocyte %   Date Value Ref Range Status   12/20/2022 20.5 19.6 - 45.3 % Final     Monocyte %   Date Value Ref Range Status   12/20/2022 11.4 5.0 - 12.0 % Final     Eosinophil %   Date Value Ref Range Status   12/20/2022 3.5 0.3 - 6.2 % Final     Basophil %   Date Value Ref Range Status   12/20/2022 0.2 0.0 - 1.5 % Final     Neutrophils, Absolute   Date Value Ref Range Status   12/20/2022 6.28 1.70 - 7.00 10*3/mm3 Final     Lymphocytes, Absolute   Date Value Ref Range Status   12/20/2022 2.00 0.70 - 3.10 10*3/mm3 Final     Monocytes, Absolute   Date Value Ref Range Status   12/20/2022 1.11 (H) 0.10 - 0.90 10*3/mm3 Final     Eosinophils, Absolute   Date Value Ref Range Status   12/20/2022 0.34 0.00 - 0.40 10*3/mm3 Final     Basophils, Absolute   Date Value Ref Range Status   12/20/2022 0.02 0.00 - 0.20 10*3/mm3 Final     nRBC   Date Value Ref Range Status   04/01/2019 0 0 /100[WBCs] Final       Lab Results   Component Value Date    GLUCOSE 110 (H) 04/01/2019    BUN 13 04/01/2019    CREATININE 1.0 04/01/2019    BCR 13.0 04/01/2019    K 4.4 04/01/2019    CO2 24 04/01/2019    CALCIUM 8.8 (L) 04/01/2019    ALBUMIN 3.1 (L) 09/29/2018    LABIL2 1.8 (H) 09/29/2018    AST 23 09/29/2018    ALT 15 (L) 09/29/2018         ASSESSMENT:    1. Severe B12 deficiency, on B12 replacement therapy. He will continue B12 replacement therapy.  2. History of heavy alcohol use.  Now discontinued since 2004  3. Hypertension.   4. BPH.  5. Anemia: Resolved    PLANS:     1. CBC today reviewed with patient.  Hemoglobin stable at 15.8 g/dL  2. Continue vitamin B12 replacement therapy.  Patient due for monthly injection today.  3. Blood pressure controlled at today's visit.  Continue to follow with PCP.  4. Follow-up in 6 months with Dr. Camara or earlier as needed  5. Reviewed signs and symptoms  of anemia and vitamin B12 deficiency to report between appointments       Patient verbalized understanding and is in agreement of the above plan.        Electronically signed by MILTON Orozco, 12/20/22, 09:27 EST

## 2022-12-20 ENCOUNTER — TELEPHONE (OUTPATIENT)
Dept: ONCOLOGY | Facility: CLINIC | Age: 77
End: 2022-12-20

## 2022-12-20 ENCOUNTER — APPOINTMENT (OUTPATIENT)
Dept: LAB | Facility: HOSPITAL | Age: 77
End: 2022-12-20
Payer: MEDICARE

## 2022-12-20 ENCOUNTER — OFFICE VISIT (OUTPATIENT)
Dept: ONCOLOGY | Facility: CLINIC | Age: 77
End: 2022-12-20

## 2022-12-20 ENCOUNTER — HOSPITAL ENCOUNTER (OUTPATIENT)
Dept: ONCOLOGY | Facility: HOSPITAL | Age: 77
Setting detail: INFUSION SERIES
Discharge: HOME | End: 2022-12-20
Payer: MEDICARE

## 2022-12-20 VITALS
BODY MASS INDEX: 35.46 KG/M2 | WEIGHT: 234 LBS | RESPIRATION RATE: 16 BRPM | HEIGHT: 68 IN | OXYGEN SATURATION: 95 % | HEART RATE: 82 BPM | SYSTOLIC BLOOD PRESSURE: 145 MMHG | TEMPERATURE: 97.8 F | DIASTOLIC BLOOD PRESSURE: 70 MMHG

## 2022-12-20 DIAGNOSIS — E53.8 B12 DEFICIENCY: Primary | ICD-10-CM

## 2022-12-20 LAB
BASOPHILS # BLD AUTO: 0.02 10*3/MM3 (ref 0–0.2)
BASOPHILS NFR BLD AUTO: 0.2 % (ref 0–1.5)
DEPRECATED RDW RBC AUTO: 45.4 FL (ref 37–54)
EOSINOPHIL # BLD AUTO: 0.34 10*3/MM3 (ref 0–0.4)
EOSINOPHIL NFR BLD AUTO: 3.5 % (ref 0.3–6.2)
ERYTHROCYTE [DISTWIDTH] IN BLOOD BY AUTOMATED COUNT: 13.1 % (ref 12.3–15.4)
HCT VFR BLD AUTO: 46.7 % (ref 37.5–51)
HGB BLD-MCNC: 15.8 G/DL (ref 13–17.7)
HOLD SPECIMEN: NORMAL
HOLD SPECIMEN: NORMAL
LYMPHOCYTES # BLD AUTO: 2 10*3/MM3 (ref 0.7–3.1)
LYMPHOCYTES NFR BLD AUTO: 20.5 % (ref 19.6–45.3)
MCH RBC QN AUTO: 32.6 PG (ref 26.6–33)
MCHC RBC AUTO-ENTMCNC: 33.8 G/DL (ref 31.5–35.7)
MCV RBC AUTO: 96.3 FL (ref 79–97)
MONOCYTES # BLD AUTO: 1.11 10*3/MM3 (ref 0.1–0.9)
MONOCYTES NFR BLD AUTO: 11.4 % (ref 5–12)
NEUTROPHILS NFR BLD AUTO: 6.28 10*3/MM3 (ref 1.7–7)
NEUTROPHILS NFR BLD AUTO: 64.4 % (ref 42.7–76)
PLATELET # BLD AUTO: 182 10*3/MM3 (ref 140–450)
PMV BLD AUTO: 9.1 FL (ref 6–12)
RBC # BLD AUTO: 4.85 10*6/MM3 (ref 4.14–5.8)
WBC NRBC COR # BLD: 9.75 10*3/MM3 (ref 3.4–10.8)

## 2022-12-20 PROCEDURE — 36415 COLL VENOUS BLD VENIPUNCTURE: CPT

## 2022-12-20 PROCEDURE — 85025 COMPLETE CBC W/AUTO DIFF WBC: CPT | Performed by: NURSE PRACTITIONER

## 2022-12-20 PROCEDURE — 96372 THER/PROPH/DIAG INJ SC/IM: CPT

## 2022-12-20 PROCEDURE — 25010000002 CYANOCOBALAMIN PER 1000 MCG: Performed by: INTERNAL MEDICINE

## 2022-12-20 PROCEDURE — 99214 OFFICE O/P EST MOD 30 MIN: CPT | Performed by: NURSE PRACTITIONER

## 2022-12-20 RX ORDER — CYANOCOBALAMIN 1000 UG/ML
1000 INJECTION, SOLUTION INTRAMUSCULAR; SUBCUTANEOUS
Status: DISCONTINUED | OUTPATIENT
Start: 2022-12-20 | End: 2022-12-21 | Stop reason: HOSPADM

## 2022-12-20 RX ADMIN — CYANOCOBALAMIN 1000 MCG: 1000 INJECTION, SOLUTION INTRAMUSCULAR at 10:00

## 2022-12-20 NOTE — PROGRESS NOTES
Pt to the clinic for N.P. visit and Vitamin B12 injection.  Injection given and tolerated well. AVS given per N.P. staff

## 2022-12-20 NOTE — TELEPHONE ENCOUNTER
Left v/m letting Pt know his next B 12 appt was the 17th of January at 915. When he came in for that we would give him the [facility] for the rest

## 2023-01-17 ENCOUNTER — HOSPITAL ENCOUNTER (OUTPATIENT)
Dept: ONCOLOGY | Facility: HOSPITAL | Age: 78
Setting detail: INFUSION SERIES
Discharge: HOME | End: 2023-01-17
Payer: MEDICARE

## 2023-01-17 DIAGNOSIS — E53.8 B12 DEFICIENCY: Primary | ICD-10-CM

## 2023-01-17 PROCEDURE — 96372 THER/PROPH/DIAG INJ SC/IM: CPT

## 2023-01-17 PROCEDURE — 25010000002 CYANOCOBALAMIN PER 1000 MCG: Performed by: INTERNAL MEDICINE

## 2023-01-17 RX ORDER — CYANOCOBALAMIN 1000 UG/ML
1000 INJECTION, SOLUTION INTRAMUSCULAR; SUBCUTANEOUS
Status: DISCONTINUED | OUTPATIENT
Start: 2023-01-17 | End: 2023-01-18 | Stop reason: HOSPADM

## 2023-01-17 RX ADMIN — CYANOCOBALAMIN 1000 MCG: 1000 INJECTION, SOLUTION INTRAMUSCULAR at 09:07

## 2023-02-14 ENCOUNTER — HOSPITAL ENCOUNTER (OUTPATIENT)
Dept: ONCOLOGY | Facility: HOSPITAL | Age: 78
Discharge: HOME | End: 2023-02-14
Admitting: INTERNAL MEDICINE
Payer: MEDICARE

## 2023-02-14 DIAGNOSIS — E53.8 B12 DEFICIENCY: Primary | ICD-10-CM

## 2023-02-14 PROCEDURE — 96372 THER/PROPH/DIAG INJ SC/IM: CPT

## 2023-02-14 PROCEDURE — 25010000002 CYANOCOBALAMIN PER 1000 MCG: Performed by: INTERNAL MEDICINE

## 2023-02-14 RX ORDER — CYANOCOBALAMIN 1000 UG/ML
1000 INJECTION, SOLUTION INTRAMUSCULAR; SUBCUTANEOUS
Status: DISCONTINUED | OUTPATIENT
Start: 2023-02-14 | End: 2023-02-15 | Stop reason: HOSPADM

## 2023-02-14 RX ADMIN — CYANOCOBALAMIN 1000 MCG: 1000 INJECTION INTRAMUSCULAR; SUBCUTANEOUS at 09:29

## 2023-02-14 NOTE — PROGRESS NOTES
Pt to the clinic for Vitamin B12 injection.  Injection given and tolerated well. Patient aware of next appointment.

## 2023-03-14 ENCOUNTER — HOSPITAL ENCOUNTER (OUTPATIENT)
Dept: ONCOLOGY | Facility: HOSPITAL | Age: 78
Discharge: HOME | End: 2023-03-14
Admitting: INTERNAL MEDICINE
Payer: MEDICARE

## 2023-03-14 DIAGNOSIS — E53.8 B12 DEFICIENCY: Primary | ICD-10-CM

## 2023-03-14 PROCEDURE — 96372 THER/PROPH/DIAG INJ SC/IM: CPT

## 2023-03-14 PROCEDURE — 25010000002 CYANOCOBALAMIN PER 1000 MCG: Performed by: INTERNAL MEDICINE

## 2023-03-14 RX ORDER — CYANOCOBALAMIN 1000 UG/ML
1000 INJECTION, SOLUTION INTRAMUSCULAR; SUBCUTANEOUS
Status: DISCONTINUED | OUTPATIENT
Start: 2023-03-14 | End: 2023-03-15 | Stop reason: HOSPADM

## 2023-03-14 RX ADMIN — CYANOCOBALAMIN 1000 MCG: 1000 INJECTION INTRAMUSCULAR; SUBCUTANEOUS at 09:19

## 2023-04-11 ENCOUNTER — HOSPITAL ENCOUNTER (OUTPATIENT)
Dept: ONCOLOGY | Facility: HOSPITAL | Age: 78
Discharge: HOME | End: 2023-04-11
Admitting: INTERNAL MEDICINE
Payer: MEDICARE

## 2023-04-11 DIAGNOSIS — E53.8 B12 DEFICIENCY: Primary | ICD-10-CM

## 2023-04-11 PROCEDURE — 25010000002 CYANOCOBALAMIN PER 1000 MCG: Performed by: INTERNAL MEDICINE

## 2023-04-11 PROCEDURE — 96372 THER/PROPH/DIAG INJ SC/IM: CPT

## 2023-04-11 RX ORDER — CYANOCOBALAMIN 1000 UG/ML
1000 INJECTION, SOLUTION INTRAMUSCULAR; SUBCUTANEOUS
Status: DISCONTINUED | OUTPATIENT
Start: 2023-04-11 | End: 2023-04-12 | Stop reason: HOSPADM

## 2023-04-11 RX ADMIN — CYANOCOBALAMIN 1000 MCG: 1000 INJECTION INTRAMUSCULAR; SUBCUTANEOUS at 09:23

## 2023-04-11 NOTE — PROGRESS NOTES
Pt to the clinic for Vitamin B12 injection.  Injection given and tolerated well.Patient aware of next appointment.

## 2023-05-09 ENCOUNTER — HOSPITAL ENCOUNTER (OUTPATIENT)
Dept: ONCOLOGY | Facility: HOSPITAL | Age: 78
Discharge: HOME | End: 2023-05-09
Admitting: INTERNAL MEDICINE
Payer: MEDICARE

## 2023-05-09 DIAGNOSIS — E53.8 B12 DEFICIENCY: Primary | ICD-10-CM

## 2023-05-09 PROCEDURE — 25010000002 CYANOCOBALAMIN PER 1000 MCG: Performed by: INTERNAL MEDICINE

## 2023-05-09 PROCEDURE — 96372 THER/PROPH/DIAG INJ SC/IM: CPT

## 2023-05-09 RX ORDER — CYANOCOBALAMIN 1000 UG/ML
1000 INJECTION, SOLUTION INTRAMUSCULAR; SUBCUTANEOUS
Status: DISCONTINUED | OUTPATIENT
Start: 2023-05-09 | End: 2023-05-10 | Stop reason: HOSPADM

## 2023-05-09 RX ADMIN — CYANOCOBALAMIN 1000 MCG: 1000 INJECTION, SOLUTION INTRAMUSCULAR at 09:40

## 2023-05-09 NOTE — PROGRESS NOTES
Pt to the clinic for Vitamin B12 injection.  Injection given and tolerated well. Patient aware of next aoointment.

## 2023-06-06 ENCOUNTER — HOSPITAL ENCOUNTER (OUTPATIENT)
Dept: ONCOLOGY | Facility: HOSPITAL | Age: 78
Discharge: HOME | End: 2023-06-06
Admitting: INTERNAL MEDICINE
Payer: MEDICARE

## 2023-06-06 DIAGNOSIS — E53.8 B12 DEFICIENCY: Primary | ICD-10-CM

## 2023-06-06 PROCEDURE — 96372 THER/PROPH/DIAG INJ SC/IM: CPT

## 2023-06-06 PROCEDURE — 25010000002 CYANOCOBALAMIN PER 1000 MCG: Performed by: INTERNAL MEDICINE

## 2023-06-06 RX ORDER — CYANOCOBALAMIN 1000 UG/ML
1000 INJECTION, SOLUTION INTRAMUSCULAR; SUBCUTANEOUS
Status: DISCONTINUED | OUTPATIENT
Start: 2023-06-06 | End: 2023-06-07 | Stop reason: HOSPADM

## 2023-06-06 RX ADMIN — CYANOCOBALAMIN 1000 MCG: 1000 INJECTION INTRAMUSCULAR; SUBCUTANEOUS at 09:48

## 2023-06-29 PROBLEM — H26.493 POSTERIOR CAPSULAR OPACIFICATION OF BOTH EYES, NOT OBSCURING VISION: Status: ACTIVE | Noted: 2021-08-05

## 2023-06-29 PROBLEM — H35.371 EPIRETINAL MEMBRANE (ERM) OF RIGHT EYE: Status: ACTIVE | Noted: 2021-08-05

## 2023-06-29 PROBLEM — Z96.1 BILATERAL PSEUDOPHAKIA: Status: ACTIVE | Noted: 2020-07-28

## 2023-06-29 PROBLEM — H35.369 MACULAR DRUSEN: Status: ACTIVE | Noted: 2020-07-28

## 2023-08-02 ENCOUNTER — HOSPITAL ENCOUNTER (OUTPATIENT)
Dept: ONCOLOGY | Facility: HOSPITAL | Age: 78
Discharge: HOME | End: 2023-08-02
Admitting: INTERNAL MEDICINE
Payer: MEDICARE

## 2023-08-02 DIAGNOSIS — E53.8 B12 DEFICIENCY: Primary | ICD-10-CM

## 2023-08-02 PROCEDURE — 25010000002 CYANOCOBALAMIN PER 1000 MCG: Performed by: INTERNAL MEDICINE

## 2023-08-02 PROCEDURE — 96372 THER/PROPH/DIAG INJ SC/IM: CPT

## 2023-08-02 RX ORDER — CYANOCOBALAMIN 1000 UG/ML
1000 INJECTION, SOLUTION INTRAMUSCULAR; SUBCUTANEOUS
Status: DISCONTINUED | OUTPATIENT
Start: 2023-08-02 | End: 2023-08-03 | Stop reason: HOSPADM

## 2023-08-02 RX ADMIN — CYANOCOBALAMIN 1000 MCG: 1000 INJECTION INTRAMUSCULAR; SUBCUTANEOUS at 09:26

## 2023-09-05 ENCOUNTER — HOSPITAL ENCOUNTER (OUTPATIENT)
Dept: ONCOLOGY | Facility: HOSPITAL | Age: 78
Discharge: HOME | End: 2023-09-05
Admitting: INTERNAL MEDICINE
Payer: MEDICARE

## 2023-09-05 DIAGNOSIS — E53.8 B12 DEFICIENCY: Primary | ICD-10-CM

## 2023-09-05 PROCEDURE — 25010000002 CYANOCOBALAMIN PER 1000 MCG: Performed by: INTERNAL MEDICINE

## 2023-09-05 PROCEDURE — 96372 THER/PROPH/DIAG INJ SC/IM: CPT

## 2023-09-05 RX ORDER — CYANOCOBALAMIN 1000 UG/ML
1000 INJECTION, SOLUTION INTRAMUSCULAR; SUBCUTANEOUS
Status: DISCONTINUED | OUTPATIENT
Start: 2023-09-05 | End: 2023-09-06 | Stop reason: HOSPADM

## 2023-09-05 RX ADMIN — CYANOCOBALAMIN 1000 MCG: 1000 INJECTION, SOLUTION INTRAMUSCULAR at 09:11

## 2023-10-03 ENCOUNTER — HOSPITAL ENCOUNTER (OUTPATIENT)
Dept: ONCOLOGY | Facility: HOSPITAL | Age: 78
Discharge: HOME | End: 2023-10-03
Admitting: INTERNAL MEDICINE
Payer: MEDICARE

## 2023-10-03 DIAGNOSIS — E53.8 B12 DEFICIENCY: Primary | ICD-10-CM

## 2023-10-03 PROCEDURE — 96372 THER/PROPH/DIAG INJ SC/IM: CPT

## 2023-10-03 PROCEDURE — 25010000002 CYANOCOBALAMIN PER 1000 MCG: Performed by: INTERNAL MEDICINE

## 2023-10-03 RX ORDER — CYANOCOBALAMIN 1000 UG/ML
1000 INJECTION, SOLUTION INTRAMUSCULAR; SUBCUTANEOUS
Status: DISCONTINUED | OUTPATIENT
Start: 2023-10-03 | End: 2023-10-04 | Stop reason: HOSPADM

## 2023-10-03 RX ADMIN — CYANOCOBALAMIN 1000 MCG: 1000 INJECTION, SOLUTION INTRAMUSCULAR at 09:16

## 2023-10-31 ENCOUNTER — HOSPITAL ENCOUNTER (OUTPATIENT)
Dept: ONCOLOGY | Facility: HOSPITAL | Age: 78
Discharge: HOME | End: 2023-10-31
Admitting: INTERNAL MEDICINE
Payer: MEDICARE

## 2023-10-31 DIAGNOSIS — E53.8 B12 DEFICIENCY: Primary | ICD-10-CM

## 2023-10-31 PROCEDURE — 25010000002 CYANOCOBALAMIN PER 1000 MCG: Performed by: INTERNAL MEDICINE

## 2023-10-31 PROCEDURE — 96372 THER/PROPH/DIAG INJ SC/IM: CPT

## 2023-10-31 RX ORDER — CYANOCOBALAMIN 1000 UG/ML
1000 INJECTION, SOLUTION INTRAMUSCULAR; SUBCUTANEOUS
Status: DISCONTINUED | OUTPATIENT
Start: 2023-10-31 | End: 2023-11-01 | Stop reason: HOSPADM

## 2023-10-31 RX ADMIN — CYANOCOBALAMIN 1000 MCG: 1000 INJECTION INTRAMUSCULAR; SUBCUTANEOUS at 09:23

## 2023-10-31 NOTE — PROGRESS NOTES
Pt to the clinic for Vitamin B12 injection.  Injection given and tolerated well. Patient aware of next visit

## 2023-12-05 ENCOUNTER — HOSPITAL ENCOUNTER (OUTPATIENT)
Dept: ONCOLOGY | Facility: HOSPITAL | Age: 78
Discharge: HOME | End: 2023-12-05
Admitting: INTERNAL MEDICINE
Payer: MEDICARE

## 2023-12-05 DIAGNOSIS — E53.8 B12 DEFICIENCY: Primary | ICD-10-CM

## 2023-12-05 PROCEDURE — 25010000002 CYANOCOBALAMIN PER 1000 MCG: Performed by: INTERNAL MEDICINE

## 2023-12-05 PROCEDURE — 96372 THER/PROPH/DIAG INJ SC/IM: CPT

## 2023-12-05 RX ORDER — CYANOCOBALAMIN 1000 UG/ML
1000 INJECTION, SOLUTION INTRAMUSCULAR; SUBCUTANEOUS
Status: DISCONTINUED | OUTPATIENT
Start: 2023-12-05 | End: 2023-12-06 | Stop reason: HOSPADM

## 2023-12-05 RX ADMIN — CYANOCOBALAMIN 1000 MCG: 1000 INJECTION, SOLUTION INTRAMUSCULAR at 09:15

## 2023-12-29 NOTE — PROGRESS NOTES
Hematology/Oncology Outpatient Follow Up    PATIENT NAME:Edgardo Jacobson  :1945  MRN: [id number]  PRIMARY CARE PHYSICIAN: Darren Mobley PA-C  REFERRING PHYSICIAN: Darren Mobley PA-C    Chief Complaint   Patient presents with    Follow-up     6 month follow up  B12 deficiency        HISTORY OF PRESENT ILLNESS:     This is a 78-year-old male who was a direct admit from PCP office for severe anemia.  He reported feeling poorly with worsening dyspnea and fatigue over the past six months.  His hemoglobin was found to be 7.5 at the PCP visit, previously had been over 14.  Patient denied any signs of bleeding.  Reported abdominal bloating without melena.                  Heme/Onc was consulted for macrocytic anemia on 18.  His CBC showed WBC of 3.7, hemoglobin 7.1,  and platelet count 136,000.  Vitamin B12 was noted to be low at less than 50.  Reticulocyte was 0.74.  Patient received a total of three units of packed red blood cells in the hospital and was started on replacement therapy for B12.  He denied any recent infections or contacts with the ill, but he did complain of abdominal bloating with constipation and rectal bleeding.  Patient had CT scan of the abdomen and pelvis on 18 which was essentially unremarkable, except for diverticulosis.  Patient was seen by GI and had EGD on 18.  This showed a normal esophagus, atrophic gastritis with retained food in the stomach from breakfast six hours earlier.  Normal duodenum.  Review of his labs at the time of discharge on 10/1/18, WBC was 4.7, hemoglobin 9.7, , platelet count 135,000.  Differentials were 53% neutrophils, 23% lymphocytes.  There was no monocytosis, eosinophilia or basophilia.  Chemistry panel:  BUN 11, creatinine 1.1.  Ferritin was 213.  Iron binding capacity was low at 225.  TSH normal at 3.2.  Haptoglobin low at 28.  .  Folate was more than 24.  Hemoccult stool was negative.    2018 - B12  injections started.   Patient has no specific complaints since the last visit.  He remains on B12 injections.  Has discontinued alcohol ingestion.  Has not had any recent hospitalization.  He denies fevers, chills, nausea or vomiting.  6/8/2021: Patient here for follow up appt for vitamin b12 deficiency.    Past Medical History:   Diagnosis Date    B12 deficiency     BPH (benign prostatic hyperplasia)     Hypertension        Past Surgical History:   Procedure Laterality Date    COLONOSCOPY           Current Outpatient Medications:     amLODIPine (NORVASC) 5 MG tablet, Take 1 tablet by mouth Daily., Disp: 30 tablet, Rfl: 0    aspirin (aspirin) 81 MG EC tablet, Daily., Disp: , Rfl:     atorvastatin (LIPITOR) 10 MG tablet, , Disp: , Rfl:     Cyanocobalamin 1000 MCG/ML kit, B-12 COMPLIANCE INJECTION 1000 MCG/ML KIT, Disp: , Rfl:     lisinopril (PRINIVIL,ZESTRIL) 20 MG tablet, Daily., Disp: , Rfl:     metoprolol succinate XL (TOPROL-XL) 50 MG 24 hr tablet, Daily., Disp: , Rfl:   No current facility-administered medications for this visit.    Facility-Administered Medications Ordered in Other Visits:     cyanocobalamin injection 1,000 mcg, 1,000 mcg, Intramuscular, Q28 Days, Zenia Camara MD    No Known Allergies    History reviewed. No pertinent family history.    Cancer-related family history is not on file.    Social History     Tobacco Use    Smoking status: Never    Smokeless tobacco: Never   Vaping Use    Vaping Use: Never used   Substance Use Topics    Alcohol use: No    Drug use: No     I have reviewed and confirmed the accuracy of the patient's history: Chief complaint, HPI, ROS, and Subjective as entered by the MA/SIGRIDN/RN. Kiera Silverman, APRN 01/02/24      SUBJECTIVE:  Edgardo is here today for his routine follow-up.  He reports that he is doing well.  His blood pressure is up a bit at the visit today but he states that it it was good at his visit with his PCP Dr. Mobley and that is also good on his  "home checks.      REVIEW OF SYSTEMS:    Review of Systems   Constitutional:  Negative for activity change, appetite change, chills, fatigue and fever.   HENT:  Negative for ear pain, facial swelling, hearing loss, mouth sores, nosebleeds, sinus pressure, sinus pain, sore throat and trouble swallowing.    Eyes:  Negative for photophobia and visual disturbance.   Respiratory:  Negative for cough, chest tightness, shortness of breath, wheezing and stridor.    Cardiovascular:  Negative for chest pain and palpitations.   Gastrointestinal:  Negative for abdominal pain, anal bleeding, blood in stool, constipation, diarrhea, nausea and vomiting.   Endocrine: Negative for cold intolerance and heat intolerance.   Genitourinary:  Negative for decreased urine volume, difficulty urinating, dysuria, hematuria, penile discharge, penile pain, scrotal swelling, testicular pain and urgency.   Musculoskeletal:  Negative for back pain, gait problem, joint swelling, neck pain and neck stiffness.   Skin:  Negative for color change and rash.   Allergic/Immunologic: Negative for environmental allergies and food allergies.   Neurological:  Negative for dizziness, seizures, syncope, speech difficulty, weakness, numbness and headaches.   Hematological:  Negative for adenopathy.        No obvious bleeding   Psychiatric/Behavioral:  Negative for agitation, behavioral problems, confusion, hallucinations and sleep disturbance.            OBJECTIVE:    Vitals:    01/02/24 0906   BP: 166/76   Pulse: 76   SpO2: 95%   Weight: 102 kg (224 lb 6.4 oz)   Height: 172.7 cm (68\")   PainSc: 0-No pain         ECOG    (0) Fully active, able to carry on all predisease performance without restriction    Physical Exam   Constitutional: He is oriented to person, place, and time. No distress.   HENT:   Head: Normocephalic and atraumatic.   Right Ear: External ear normal.   Left Ear: External ear normal.   Nose: Nose normal. No rhinorrhea or congestion. "   Mouth/Throat: Mucous membranes are moist. No oropharyngeal exudate or posterior oropharyngeal erythema. Oropharynx is clear.   Eyes: Pupils are equal, round, and reactive to light. Conjunctivae are normal. Right eye exhibits no discharge. Left eye exhibits no discharge. No scleral icterus.   Neck: No thyromegaly present.   Cardiovascular: Normal rate, regular rhythm and normal heart sounds. Exam reveals no gallop and no friction rub.   Pulmonary/Chest: Effort normal. No stridor. No respiratory distress. He has no wheezes.   Abdominal: Soft. Bowel sounds are normal. He exhibits no distension and no mass. There is no abdominal tenderness. There is no rebound and no guarding.   Musculoskeletal: Normal range of motion. No swelling or tenderness.      Right lower leg: No edema.      Left lower leg: No edema.   Lymphadenopathy:     He has no cervical adenopathy.   Neurological: He is alert and oriented to person, place, and time. He exhibits normal muscle tone.   Skin: Skin is warm and dry. Capillary refill takes less than 2 seconds. No bruising, no lesion and no rash noted. He is not diaphoretic. No erythema. No jaundice.   Psychiatric: His behavior is normal. Mood, judgment and thought content normal.   Nursing note and vitals reviewed.      I have reexamined the patient and the results are consistent with the previously documented exam. Kiera Silverman, APRN        RECENT LABS    WBC   Date Value Ref Range Status   01/02/2024 6.36 3.40 - 10.80 10*3/mm3 Final     RBC   Date Value Ref Range Status   01/02/2024 4.40 4.14 - 5.80 10*6/mm3 Final     Hemoglobin   Date Value Ref Range Status   01/02/2024 14.5 13.0 - 17.7 g/dL Final     Hematocrit   Date Value Ref Range Status   01/02/2024 43.9 37.5 - 51.0 % Final     MCV   Date Value Ref Range Status   01/02/2024 99.8 (H) 79.0 - 97.0 fL Final     MCH   Date Value Ref Range Status   01/02/2024 33.0 26.6 - 33.0 pg Final     MCHC   Date Value Ref Range Status   01/02/2024  33.0 31.5 - 35.7 g/dL Final     RDW   Date Value Ref Range Status   01/02/2024 13.6 12.3 - 15.4 % Final     RDW-SD   Date Value Ref Range Status   01/02/2024 48.2 37.0 - 54.0 fl Final     MPV   Date Value Ref Range Status   01/02/2024 9.2 6.0 - 12.0 fL Final     Platelets   Date Value Ref Range Status   01/02/2024 202 140 - 450 10*3/mm3 Final     Neutrophil %   Date Value Ref Range Status   01/02/2024 56.0 42.7 - 76.0 % Final     Lymphocyte %   Date Value Ref Range Status   01/02/2024 30.2 19.6 - 45.3 % Final     Monocyte %   Date Value Ref Range Status   01/02/2024 9.9 5.0 - 12.0 % Final     Eosinophil %   Date Value Ref Range Status   01/02/2024 3.6 0.3 - 6.2 % Final     Basophil %   Date Value Ref Range Status   01/02/2024 0.3 0.0 - 1.5 % Final     Neutrophils, Absolute   Date Value Ref Range Status   01/02/2024 3.56 1.70 - 7.00 10*3/mm3 Final     Lymphocytes, Absolute   Date Value Ref Range Status   01/02/2024 1.92 0.70 - 3.10 10*3/mm3 Final     Monocytes, Absolute   Date Value Ref Range Status   01/02/2024 0.63 0.10 - 0.90 10*3/mm3 Final     Eosinophils, Absolute   Date Value Ref Range Status   01/02/2024 0.23 0.00 - 0.40 10*3/mm3 Final     Basophils, Absolute   Date Value Ref Range Status   01/02/2024 0.02 0.00 - 0.20 10*3/mm3 Final     nRBC   Date Value Ref Range Status   04/01/2019 0 0 /100[WBCs] Final       Lab Results   Component Value Date    GLUCOSE 110 (H) 04/01/2019    BUN 13 04/01/2019    CREATININE 1.0 04/01/2019    BCR 13.0 04/01/2019    K 4.4 04/01/2019    CO2 24 04/01/2019    CALCIUM 8.8 (L) 04/01/2019    ALBUMIN 3.1 (L) 09/29/2018    LABIL2 1.8 (H) 09/29/2018    AST 23 09/29/2018    ALT 15 (L) 09/29/2018         ASSESSMENT:    Severe B12 deficiency, on B12 replacement therapy.  We will continue B12 replacement therapy  History of heavy alcohol use.  Now discontinued since 2004  Hypertension.  Managed by his PCP CBC reviewed  BPH.  Anemia: Resolved    PLANS:     CBC today reviewed with patient.   Hemoglobin stable at 14.5 g/dL, MCV 99.8  Continue vitamin B12 replacement therapy.  Patient due for monthly injection today.  Blood pressure elevated at today's visit.  Continue to follow with PCP.  Follow-up 6 months with Dr. Camara or sooner if needed  Reviewed signs and symptoms of anemia and vitamin B12 deficiency to report between appointments       Patient verbalized understanding and is in agreement of the above plan.

## 2024-01-02 ENCOUNTER — OFFICE VISIT (OUTPATIENT)
Dept: ONCOLOGY | Facility: CLINIC | Age: 79
End: 2024-01-02
Payer: MEDICAID

## 2024-01-02 ENCOUNTER — APPOINTMENT (OUTPATIENT)
Dept: LAB | Facility: HOSPITAL | Age: 79
End: 2024-01-02
Payer: MEDICARE

## 2024-01-02 ENCOUNTER — HOSPITAL ENCOUNTER (OUTPATIENT)
Dept: ONCOLOGY | Facility: HOSPITAL | Age: 79
Discharge: HOME | End: 2024-01-02
Payer: MEDICARE

## 2024-01-02 VITALS
HEIGHT: 68 IN | HEART RATE: 76 BPM | BODY MASS INDEX: 34.01 KG/M2 | DIASTOLIC BLOOD PRESSURE: 76 MMHG | OXYGEN SATURATION: 95 % | WEIGHT: 224.4 LBS | SYSTOLIC BLOOD PRESSURE: 166 MMHG

## 2024-01-02 DIAGNOSIS — E53.8 B12 DEFICIENCY: Primary | ICD-10-CM

## 2024-01-02 LAB
BASOPHILS # BLD AUTO: 0.02 10*3/MM3 (ref 0–0.2)
BASOPHILS NFR BLD AUTO: 0.3 % (ref 0–1.5)
DEPRECATED RDW RBC AUTO: 48.2 FL (ref 37–54)
EOSINOPHIL # BLD AUTO: 0.23 10*3/MM3 (ref 0–0.4)
EOSINOPHIL NFR BLD AUTO: 3.6 % (ref 0.3–6.2)
ERYTHROCYTE [DISTWIDTH] IN BLOOD BY AUTOMATED COUNT: 13.6 % (ref 12.3–15.4)
HCT VFR BLD AUTO: 43.9 % (ref 37.5–51)
HGB BLD-MCNC: 14.5 G/DL (ref 13–17.7)
HOLD SPECIMEN: NORMAL
HOLD SPECIMEN: NORMAL
LYMPHOCYTES # BLD AUTO: 1.92 10*3/MM3 (ref 0.7–3.1)
LYMPHOCYTES NFR BLD AUTO: 30.2 % (ref 19.6–45.3)
MCH RBC QN AUTO: 33 PG (ref 26.6–33)
MCHC RBC AUTO-ENTMCNC: 33 G/DL (ref 31.5–35.7)
MCV RBC AUTO: 99.8 FL (ref 79–97)
MONOCYTES # BLD AUTO: 0.63 10*3/MM3 (ref 0.1–0.9)
MONOCYTES NFR BLD AUTO: 9.9 % (ref 5–12)
NEUTROPHILS NFR BLD AUTO: 3.56 10*3/MM3 (ref 1.7–7)
NEUTROPHILS NFR BLD AUTO: 56 % (ref 42.7–76)
PLATELET # BLD AUTO: 202 10*3/MM3 (ref 140–450)
PMV BLD AUTO: 9.2 FL (ref 6–12)
RBC # BLD AUTO: 4.4 10*6/MM3 (ref 4.14–5.8)
WBC NRBC COR # BLD AUTO: 6.36 10*3/MM3 (ref 3.4–10.8)

## 2024-01-02 PROCEDURE — 3077F SYST BP >= 140 MM HG: CPT | Performed by: NURSE PRACTITIONER

## 2024-01-02 PROCEDURE — 1126F AMNT PAIN NOTED NONE PRSNT: CPT | Performed by: NURSE PRACTITIONER

## 2024-01-02 PROCEDURE — 1159F MED LIST DOCD IN RCRD: CPT | Performed by: NURSE PRACTITIONER

## 2024-01-02 PROCEDURE — 85025 COMPLETE CBC W/AUTO DIFF WBC: CPT | Performed by: NURSE PRACTITIONER

## 2024-01-02 PROCEDURE — 36415 COLL VENOUS BLD VENIPUNCTURE: CPT

## 2024-01-02 PROCEDURE — 1160F RVW MEDS BY RX/DR IN RCRD: CPT | Performed by: NURSE PRACTITIONER

## 2024-01-02 PROCEDURE — 3078F DIAST BP <80 MM HG: CPT | Performed by: NURSE PRACTITIONER

## 2024-01-02 PROCEDURE — 25010000002 CYANOCOBALAMIN PER 1000 MCG: Performed by: INTERNAL MEDICINE

## 2024-01-02 PROCEDURE — 99214 OFFICE O/P EST MOD 30 MIN: CPT | Performed by: NURSE PRACTITIONER

## 2024-01-02 PROCEDURE — 96372 THER/PROPH/DIAG INJ SC/IM: CPT

## 2024-01-02 RX ORDER — CYANOCOBALAMIN 1000 UG/ML
1000 INJECTION, SOLUTION INTRAMUSCULAR; SUBCUTANEOUS
Status: DISCONTINUED | OUTPATIENT
Start: 2024-01-02 | End: 2024-01-03 | Stop reason: HOSPADM

## 2024-01-02 RX ADMIN — CYANOCOBALAMIN 1000 MCG: 1000 INJECTION, SOLUTION INTRAMUSCULAR at 09:46

## 2024-01-02 NOTE — PROGRESS NOTES
Pt to the clinic for Vitamin B12 injection.  Injection given and tolerated well. AVS given per M.D. staff.

## 2024-01-30 ENCOUNTER — HOSPITAL ENCOUNTER (OUTPATIENT)
Dept: ONCOLOGY | Facility: HOSPITAL | Age: 79
Discharge: HOME | End: 2024-01-30
Admitting: INTERNAL MEDICINE
Payer: MEDICARE

## 2024-01-30 DIAGNOSIS — E53.8 B12 DEFICIENCY: Primary | ICD-10-CM

## 2024-01-30 PROCEDURE — 25010000002 CYANOCOBALAMIN PER 1000 MCG: Performed by: INTERNAL MEDICINE

## 2024-01-30 PROCEDURE — 96372 THER/PROPH/DIAG INJ SC/IM: CPT

## 2024-01-30 RX ORDER — CYANOCOBALAMIN 1000 UG/ML
1000 INJECTION, SOLUTION INTRAMUSCULAR; SUBCUTANEOUS
Status: DISCONTINUED | OUTPATIENT
Start: 2024-01-30 | End: 2024-01-31 | Stop reason: HOSPADM

## 2024-01-30 RX ADMIN — CYANOCOBALAMIN 1000 MCG: 1000 INJECTION INTRAMUSCULAR; SUBCUTANEOUS at 08:38

## 2024-02-27 ENCOUNTER — HOSPITAL ENCOUNTER (OUTPATIENT)
Dept: ONCOLOGY | Facility: HOSPITAL | Age: 79
Discharge: HOME | End: 2024-02-27
Admitting: INTERNAL MEDICINE
Payer: MEDICARE

## 2024-02-27 DIAGNOSIS — E53.8 B12 DEFICIENCY: Primary | ICD-10-CM

## 2024-02-27 PROCEDURE — 25010000002 CYANOCOBALAMIN PER 1000 MCG: Performed by: INTERNAL MEDICINE

## 2024-02-27 PROCEDURE — 96372 THER/PROPH/DIAG INJ SC/IM: CPT

## 2024-02-27 RX ORDER — CYANOCOBALAMIN 1000 UG/ML
1000 INJECTION, SOLUTION INTRAMUSCULAR; SUBCUTANEOUS
Status: DISCONTINUED | OUTPATIENT
Start: 2024-02-27 | End: 2024-02-28 | Stop reason: HOSPADM

## 2024-02-27 RX ADMIN — CYANOCOBALAMIN 1000 MCG: 1000 INJECTION INTRAMUSCULAR; SUBCUTANEOUS at 08:45

## 2024-03-26 ENCOUNTER — HOSPITAL ENCOUNTER (OUTPATIENT)
Dept: ONCOLOGY | Facility: HOSPITAL | Age: 79
Discharge: HOME | End: 2024-03-26
Admitting: INTERNAL MEDICINE
Payer: MEDICARE

## 2024-03-26 DIAGNOSIS — E53.8 B12 DEFICIENCY: Primary | ICD-10-CM

## 2024-03-26 PROCEDURE — 25010000002 CYANOCOBALAMIN PER 1000 MCG: Performed by: INTERNAL MEDICINE

## 2024-03-26 PROCEDURE — 96372 THER/PROPH/DIAG INJ SC/IM: CPT

## 2024-03-26 RX ORDER — CYANOCOBALAMIN 1000 UG/ML
1000 INJECTION, SOLUTION INTRAMUSCULAR; SUBCUTANEOUS
Status: DISCONTINUED | OUTPATIENT
Start: 2024-03-26 | End: 2024-03-27 | Stop reason: HOSPADM

## 2024-03-26 RX ADMIN — CYANOCOBALAMIN 1000 MCG: 1000 INJECTION INTRAMUSCULAR; SUBCUTANEOUS at 08:33

## 2024-04-23 ENCOUNTER — HOSPITAL ENCOUNTER (OUTPATIENT)
Dept: ONCOLOGY | Facility: HOSPITAL | Age: 79
Discharge: HOME | End: 2024-04-23
Admitting: INTERNAL MEDICINE
Payer: MEDICARE

## 2024-04-23 DIAGNOSIS — E53.8 B12 DEFICIENCY: Primary | ICD-10-CM

## 2024-04-23 PROCEDURE — 96372 THER/PROPH/DIAG INJ SC/IM: CPT

## 2024-04-23 PROCEDURE — 25010000002 CYANOCOBALAMIN PER 1000 MCG: Performed by: INTERNAL MEDICINE

## 2024-04-23 RX ORDER — CYANOCOBALAMIN 1000 UG/ML
1000 INJECTION, SOLUTION INTRAMUSCULAR; SUBCUTANEOUS
Status: DISCONTINUED | OUTPATIENT
Start: 2024-04-23 | End: 2024-04-24 | Stop reason: HOSPADM

## 2024-04-23 RX ADMIN — CYANOCOBALAMIN 1000 MCG: 1000 INJECTION INTRAMUSCULAR; SUBCUTANEOUS at 08:50

## 2024-05-21 ENCOUNTER — HOSPITAL ENCOUNTER (OUTPATIENT)
Dept: ONCOLOGY | Facility: HOSPITAL | Age: 79
Discharge: HOME | End: 2024-05-21
Admitting: INTERNAL MEDICINE
Payer: MEDICARE

## 2024-05-21 VITALS — DIASTOLIC BLOOD PRESSURE: 68 MMHG | SYSTOLIC BLOOD PRESSURE: 165 MMHG

## 2024-05-21 DIAGNOSIS — E53.8 B12 DEFICIENCY: Primary | ICD-10-CM

## 2024-05-21 PROCEDURE — 96372 THER/PROPH/DIAG INJ SC/IM: CPT

## 2024-05-21 PROCEDURE — 25010000002 CYANOCOBALAMIN PER 1000 MCG: Performed by: INTERNAL MEDICINE

## 2024-05-21 RX ORDER — CYANOCOBALAMIN 1000 UG/ML
1000 INJECTION, SOLUTION INTRAMUSCULAR; SUBCUTANEOUS
Status: DISCONTINUED | OUTPATIENT
Start: 2024-05-21 | End: 2024-05-22 | Stop reason: HOSPADM

## 2024-05-21 RX ADMIN — CYANOCOBALAMIN 1000 MCG: 1000 INJECTION INTRAMUSCULAR; SUBCUTANEOUS at 08:30

## 2024-05-21 NOTE — PROGRESS NOTES
Pt to clinic for B12 injection,  pt tolerated well and denies problems/issues.  Pt has next appt and declines AVS.

## 2024-06-17 NOTE — PROGRESS NOTES
Hematology/Oncology Outpatient Follow Up    PATIENT NAME:Edgardo Jacobson  :1945  MRN: [id number]  PRIMARY CARE PHYSICIAN: Darren Mobley PA-C  REFERRING PHYSICIAN: Darren Mobley PA-C    Chief Complaint   Patient presents with    Follow-up     Anemia        HISTORY OF PRESENT ILLNESS:     This is a 78-year-old male who was a direct admit from PCP office for severe anemia.  He reported feeling poorly with worsening dyspnea and fatigue over the past six months.  His hemoglobin was found to be 7.5 at the PCP visit, previously had been over 14.  Patient denied any signs of bleeding.  Reported abdominal bloating without melena.                  Heme/Onc was consulted for macrocytic anemia on 18.  His CBC showed WBC of 3.7, hemoglobin 7.1,  and platelet count 136,000.  Vitamin B12 was noted to be low at less than 50.  Reticulocyte was 0.74.  Patient received a total of three units of packed red blood cells in the hospital and was started on replacement therapy for B12.  He denied any recent infections or contacts with the ill, but he did complain of abdominal bloating with constipation and rectal bleeding.  Patient had CT scan of the abdomen and pelvis on 18 which was essentially unremarkable, except for diverticulosis.  Patient was seen by GI and had EGD on 18.  This showed a normal esophagus, atrophic gastritis with retained food in the stomach from breakfast six hours earlier.  Normal duodenum.  Review of his labs at the time of discharge on 10/1/18, WBC was 4.7, hemoglobin 9.7, , platelet count 135,000.  Differentials were 53% neutrophils, 23% lymphocytes.  There was no monocytosis, eosinophilia or basophilia.  Chemistry panel:  BUN 11, creatinine 1.1.  Ferritin was 213.  Iron binding capacity was low at 225.  TSH normal at 3.2.  Haptoglobin low at 28.  .  Folate was more than 24.  Hemoccult stool was negative.    2018 - B12 injections started.   Patient has  no specific complaints since the last visit.  He remains on B12 injections.  Has discontinued alcohol ingestion.  Has not had any recent hospitalization.  He denies fevers, chills, nausea or vomiting.  6/8/2021: Patient here for follow up appt for vitamin b12 deficiency.    Past Medical History:   Diagnosis Date    B12 deficiency     BPH (benign prostatic hyperplasia)     Hypertension        Past Surgical History:   Procedure Laterality Date    COLONOSCOPY           Current Outpatient Medications:     amLODIPine (NORVASC) 5 MG tablet, Take 1 tablet by mouth Daily., Disp: 30 tablet, Rfl: 0    aspirin (aspirin) 81 MG EC tablet, Daily., Disp: , Rfl:     atorvastatin (LIPITOR) 10 MG tablet, , Disp: , Rfl:     Cyanocobalamin 1000 MCG/ML kit, B-12 COMPLIANCE INJECTION 1000 MCG/ML KIT, Disp: , Rfl:     lisinopril (PRINIVIL,ZESTRIL) 20 MG tablet, Daily., Disp: , Rfl:     metoprolol succinate XL (TOPROL-XL) 50 MG 24 hr tablet, Daily., Disp: , Rfl:   No current facility-administered medications for this visit.    Facility-Administered Medications Ordered in Other Visits:     cyanocobalamin injection 1,000 mcg, 1,000 mcg, Intramuscular, Q28 Days, Zenia Camara MD, 1,000 mcg at 06/18/24 1037    No Known Allergies    No family history on file.    Cancer-related family history is not on file.    Social History     Tobacco Use    Smoking status: Never    Smokeless tobacco: Never   Vaping Use    Vaping status: Never Used   Substance Use Topics    Alcohol use: No    Drug use: No       I have reviewed and confirmed the accuracy of the patient's history: Chief complaint, HPI, ROS, and Subjective as entered by the MA/LPN/RN. Zenia Camara MD 06/18/24 6/18/24      SUBJECTIVE:    Edgardo is here today for his routine follow-up.  He reports that he is doing well.  His blood pressure is up a bit at the visit today but he states that it it was good at his visit with his PCP Dr. Mobley and that is also good on his home  "checks.      Patient is here today for routine follow-up and does not have any new issues.  He denies changes in his energy level.  He denies any rectal bleeding.      REVIEW OF SYSTEMS:    Review of Systems   Constitutional:  Negative for activity change, appetite change, chills, fatigue and fever.   HENT:  Negative for ear pain, facial swelling, hearing loss, mouth sores, nosebleeds, sinus pressure, sinus pain, sore throat and trouble swallowing.    Eyes:  Negative for photophobia and visual disturbance.   Respiratory:  Negative for cough, chest tightness, shortness of breath, wheezing and stridor.    Cardiovascular:  Negative for chest pain and palpitations.   Gastrointestinal:  Negative for abdominal pain, anal bleeding, blood in stool, constipation, diarrhea, nausea and vomiting.   Endocrine: Negative for cold intolerance and heat intolerance.   Genitourinary:  Negative for decreased urine volume, difficulty urinating, dysuria, hematuria, penile discharge, penile pain, scrotal swelling, testicular pain and urgency.   Musculoskeletal:  Negative for back pain, gait problem, joint swelling, neck pain and neck stiffness.   Skin:  Negative for color change and rash.   Allergic/Immunologic: Negative for environmental allergies and food allergies.   Neurological:  Negative for dizziness, seizures, syncope, speech difficulty, weakness, numbness and headaches.   Hematological:  Negative for adenopathy.        No obvious bleeding   Psychiatric/Behavioral:  Negative for agitation, behavioral problems, confusion, hallucinations and sleep disturbance.            OBJECTIVE:    Vitals:    06/18/24 0944   BP: 166/80   Pulse: 63   Resp: 20   Temp: 98 °F (36.7 °C)   TempSrc: Infrared   SpO2: 93%   Weight: 102 kg (225 lb)   Height: 172.7 cm (68\")   PainSc: 0-No pain           ECOG    (0) Fully active, able to carry on all predisease performance without restriction    Physical Exam   Constitutional: He is oriented to person, " place, and time. No distress.   HENT:   Head: Normocephalic and atraumatic.   Right Ear: External ear normal.   Left Ear: External ear normal.   Nose: Nose normal. No rhinorrhea or congestion.   Mouth/Throat: Mucous membranes are moist. No oropharyngeal exudate or posterior oropharyngeal erythema. Oropharynx is clear.   Eyes: Pupils are equal, round, and reactive to light. Conjunctivae are normal. Right eye exhibits no discharge. Left eye exhibits no discharge. No scleral icterus.   Neck: No thyromegaly present.   Cardiovascular: Normal rate, regular rhythm and normal heart sounds. Exam reveals no gallop and no friction rub.   Pulmonary/Chest: Effort normal. No stridor. No respiratory distress. He has no wheezes.   Abdominal: Soft. Bowel sounds are normal. He exhibits no distension and no mass. There is no abdominal tenderness. There is no rebound and no guarding.   Musculoskeletal: Normal range of motion. No swelling or tenderness.      Right lower leg: No edema.      Left lower leg: No edema.   Lymphadenopathy:     He has no cervical adenopathy.   Neurological: He is alert and oriented to person, place, and time. He exhibits normal muscle tone.   Skin: Skin is warm and dry. Capillary refill takes less than 2 seconds. No bruising, no lesion and no rash noted. He is not diaphoretic. No erythema. No jaundice.   Psychiatric: His behavior is normal. Mood, judgment and thought content normal.   Nursing note and vitals reviewed.      I have reexamined the patient and the results are consistent with the previously documented exam. Zenia Camara MD        RECENT LABS    WBC   Date Value Ref Range Status   06/18/2024 5.76 3.40 - 10.80 10*3/mm3 Final     RBC   Date Value Ref Range Status   06/18/2024 3.92 (L) 4.14 - 5.80 10*6/mm3 Final     Hemoglobin   Date Value Ref Range Status   06/18/2024 12.9 (L) 13.0 - 17.7 g/dL Final     Hematocrit   Date Value Ref Range Status   06/18/2024 38.8 37.5 - 51.0 % Final     MCV    Date Value Ref Range Status   06/18/2024 99.0 (H) 79.0 - 97.0 fL Final     MCH   Date Value Ref Range Status   06/18/2024 32.9 26.6 - 33.0 pg Final     MCHC   Date Value Ref Range Status   06/18/2024 33.2 31.5 - 35.7 g/dL Final     RDW   Date Value Ref Range Status   06/18/2024 12.6 12.3 - 15.4 % Final     RDW-SD   Date Value Ref Range Status   06/18/2024 44.4 37.0 - 54.0 fl Final     MPV   Date Value Ref Range Status   06/18/2024 8.8 6.0 - 12.0 fL Final     Platelets   Date Value Ref Range Status   06/18/2024 155 140 - 450 10*3/mm3 Final     Neutrophil %   Date Value Ref Range Status   06/18/2024 56.6 42.7 - 76.0 % Final     Lymphocyte %   Date Value Ref Range Status   06/18/2024 29.3 19.6 - 45.3 % Final     Monocyte %   Date Value Ref Range Status   06/18/2024 9.9 5.0 - 12.0 % Final     Eosinophil %   Date Value Ref Range Status   06/18/2024 4.0 0.3 - 6.2 % Final     Basophil %   Date Value Ref Range Status   06/18/2024 0.2 0.0 - 1.5 % Final     Neutrophils, Absolute   Date Value Ref Range Status   06/18/2024 3.26 1.70 - 7.00 10*3/mm3 Final     Lymphocytes, Absolute   Date Value Ref Range Status   06/18/2024 1.69 0.70 - 3.10 10*3/mm3 Final     Monocytes, Absolute   Date Value Ref Range Status   06/18/2024 0.57 0.10 - 0.90 10*3/mm3 Final     Eosinophils, Absolute   Date Value Ref Range Status   06/18/2024 0.23 0.00 - 0.40 10*3/mm3 Final     Basophils, Absolute   Date Value Ref Range Status   06/18/2024 0.01 0.00 - 0.20 10*3/mm3 Final     nRBC   Date Value Ref Range Status   04/01/2019 0 0 /100[WBCs] Final       Lab Results   Component Value Date    GLUCOSE 110 (H) 04/01/2019    BUN 13 04/01/2019    CREATININE 1.0 04/01/2019    BCR 13.0 04/01/2019    K 4.4 04/01/2019    CO2 24 04/01/2019    CALCIUM 8.8 (L) 04/01/2019    ALBUMIN 3.1 (L) 09/29/2018    LABIL2 1.8 (H) 09/29/2018    AST 23 09/29/2018    ALT 15 (L) 09/29/2018         ASSESSMENT:    Severe B12 deficiency, on B12 replacement therapy.  Continue B12  injections  Worsening anemia: Will obtain anemia workup today  History of heavy alcohol use.  Now discontinued since 2004  Hypertension.  Managed by his PCP  BPH.      PLANS:     His hemoglobin declined to 12.9 g per DL.  His baseline is between 14 and 15 g per DL therefore perform additional workup today to see if any additional deficiencies.  Continue B12 injections  Patient will continue to follow-up with his PCP for elevated blood pressure  Follow-up 6 weeks.  Repeat CBC at that time  Reviewed signs and symptoms of anemia and vitamin B12 deficiency to report between appointments       Patient verbalized understanding and is in agreement of the above plan.          I spent 30 total minutes, face-to-face, caring for Gene today. 90% of this time involved counseling and/or coordination of care as documented within this note.

## 2024-06-18 ENCOUNTER — APPOINTMENT (OUTPATIENT)
Dept: LAB | Facility: HOSPITAL | Age: 79
End: 2024-06-18
Payer: MEDICARE

## 2024-06-18 ENCOUNTER — OFFICE VISIT (OUTPATIENT)
Dept: ONCOLOGY | Facility: CLINIC | Age: 79
End: 2024-06-18
Payer: MEDICARE

## 2024-06-18 ENCOUNTER — HOSPITAL ENCOUNTER (OUTPATIENT)
Dept: ONCOLOGY | Facility: HOSPITAL | Age: 79
Discharge: HOME | End: 2024-06-18
Payer: MEDICARE

## 2024-06-18 ENCOUNTER — LAB (OUTPATIENT)
Dept: LAB | Facility: HOSPITAL | Age: 79
End: 2024-06-18
Payer: MEDICARE

## 2024-06-18 VITALS
DIASTOLIC BLOOD PRESSURE: 80 MMHG | TEMPERATURE: 98 F | WEIGHT: 225 LBS | BODY MASS INDEX: 34.1 KG/M2 | OXYGEN SATURATION: 93 % | HEIGHT: 68 IN | HEART RATE: 63 BPM | RESPIRATION RATE: 20 BRPM | SYSTOLIC BLOOD PRESSURE: 166 MMHG

## 2024-06-18 DIAGNOSIS — D64.9 ANEMIA, UNSPECIFIED TYPE: ICD-10-CM

## 2024-06-18 DIAGNOSIS — E53.8 B12 DEFICIENCY: Primary | ICD-10-CM

## 2024-06-18 LAB
BASOPHILS # BLD AUTO: 0.01 10*3/MM3 (ref 0–0.2)
BASOPHILS NFR BLD AUTO: 0.2 % (ref 0–1.5)
DEPRECATED RDW RBC AUTO: 44.4 FL (ref 37–54)
EOSINOPHIL # BLD AUTO: 0.23 10*3/MM3 (ref 0–0.4)
EOSINOPHIL NFR BLD AUTO: 4 % (ref 0.3–6.2)
ERYTHROCYTE [DISTWIDTH] IN BLOOD BY AUTOMATED COUNT: 12.6 % (ref 12.3–15.4)
FERRITIN SERPL-MCNC: 104 NG/ML (ref 30–400)
FOLATE SERPL-MCNC: 8.92 NG/ML (ref 4.78–24.2)
HAPTOGLOB SERPL-MCNC: 121 MG/DL (ref 30–200)
HCT VFR BLD AUTO: 38.8 % (ref 37.5–51)
HGB BLD-MCNC: 12.9 G/DL (ref 13–17.7)
HOLD SPECIMEN: NORMAL
HOLD SPECIMEN: NORMAL
IRON 24H UR-MRATE: 106 MCG/DL (ref 59–158)
IRON SATN MFR SERPL: 32 % (ref 20–50)
LDH SERPL-CCNC: 212 U/L (ref 135–225)
LYMPHOCYTES # BLD AUTO: 1.69 10*3/MM3 (ref 0.7–3.1)
LYMPHOCYTES NFR BLD AUTO: 29.3 % (ref 19.6–45.3)
MCH RBC QN AUTO: 32.9 PG (ref 26.6–33)
MCHC RBC AUTO-ENTMCNC: 33.2 G/DL (ref 31.5–35.7)
MCV RBC AUTO: 99 FL (ref 79–97)
MONOCYTES # BLD AUTO: 0.57 10*3/MM3 (ref 0.1–0.9)
MONOCYTES NFR BLD AUTO: 9.9 % (ref 5–12)
NEUTROPHILS NFR BLD AUTO: 3.26 10*3/MM3 (ref 1.7–7)
NEUTROPHILS NFR BLD AUTO: 56.6 % (ref 42.7–76)
PLATELET # BLD AUTO: 155 10*3/MM3 (ref 140–450)
PMV BLD AUTO: 8.8 FL (ref 6–12)
RBC # BLD AUTO: 3.92 10*6/MM3 (ref 4.14–5.8)
RETICS # AUTO: 0.05 10*6/MM3 (ref 0.02–0.13)
RETICS/RBC NFR AUTO: 1.3 % (ref 0.7–1.9)
TIBC SERPL-MCNC: 334 MCG/DL (ref 298–536)
TRANSFERRIN SERPL-MCNC: 224 MG/DL (ref 200–360)
WBC NRBC COR # BLD AUTO: 5.76 10*3/MM3 (ref 3.4–10.8)

## 2024-06-18 PROCEDURE — 36415 COLL VENOUS BLD VENIPUNCTURE: CPT

## 2024-06-18 PROCEDURE — 83010 ASSAY OF HAPTOGLOBIN QUANT: CPT | Performed by: INTERNAL MEDICINE

## 2024-06-18 PROCEDURE — 1126F AMNT PAIN NOTED NONE PRSNT: CPT | Performed by: INTERNAL MEDICINE

## 2024-06-18 PROCEDURE — 85025 COMPLETE CBC W/AUTO DIFF WBC: CPT

## 2024-06-18 PROCEDURE — 82746 ASSAY OF FOLIC ACID SERUM: CPT | Performed by: INTERNAL MEDICINE

## 2024-06-18 PROCEDURE — 3079F DIAST BP 80-89 MM HG: CPT | Performed by: INTERNAL MEDICINE

## 2024-06-18 PROCEDURE — 85045 AUTOMATED RETICULOCYTE COUNT: CPT | Performed by: INTERNAL MEDICINE

## 2024-06-18 PROCEDURE — 3077F SYST BP >= 140 MM HG: CPT | Performed by: INTERNAL MEDICINE

## 2024-06-18 PROCEDURE — 84466 ASSAY OF TRANSFERRIN: CPT | Performed by: INTERNAL MEDICINE

## 2024-06-18 PROCEDURE — 25010000002 CYANOCOBALAMIN PER 1000 MCG: Performed by: INTERNAL MEDICINE

## 2024-06-18 PROCEDURE — 96372 THER/PROPH/DIAG INJ SC/IM: CPT

## 2024-06-18 PROCEDURE — 83615 LACTATE (LD) (LDH) ENZYME: CPT | Performed by: INTERNAL MEDICINE

## 2024-06-18 PROCEDURE — 83540 ASSAY OF IRON: CPT | Performed by: INTERNAL MEDICINE

## 2024-06-18 PROCEDURE — 82728 ASSAY OF FERRITIN: CPT | Performed by: INTERNAL MEDICINE

## 2024-06-18 PROCEDURE — 99214 OFFICE O/P EST MOD 30 MIN: CPT | Performed by: INTERNAL MEDICINE

## 2024-06-18 RX ORDER — CYANOCOBALAMIN 1000 UG/ML
1000 INJECTION, SOLUTION INTRAMUSCULAR; SUBCUTANEOUS
Status: DISCONTINUED | OUTPATIENT
Start: 2024-06-18 | End: 2024-06-19 | Stop reason: HOSPADM

## 2024-06-18 RX ADMIN — CYANOCOBALAMIN 1000 MCG: 1000 INJECTION INTRAMUSCULAR; SUBCUTANEOUS at 10:37

## 2024-06-19 LAB
ALBUMIN SERPL ELPH-MCNC: 3.7 G/DL (ref 2.9–4.4)
ALBUMIN/GLOB SERPL: 1.4 {RATIO} (ref 0.7–1.7)
ALPHA1 GLOB SERPL ELPH-MCNC: 0.3 G/DL (ref 0–0.4)
ALPHA2 GLOB SERPL ELPH-MCNC: 0.7 G/DL (ref 0.4–1)
B-GLOBULIN SERPL ELPH-MCNC: 0.9 G/DL (ref 0.7–1.3)
GAMMA GLOB SERPL ELPH-MCNC: 0.8 G/DL (ref 0.4–1.8)
GLOBULIN SER CALC-MCNC: 2.6 G/DL (ref 2.2–3.9)
LABORATORY COMMENT REPORT: NORMAL
M PROTEIN SERPL ELPH-MCNC: NORMAL G/DL
PROT PATTERN SERPL ELPH-IMP: NORMAL
PROT SERPL-MCNC: 6.3 G/DL (ref 6–8.5)

## 2024-07-16 ENCOUNTER — HOSPITAL ENCOUNTER (OUTPATIENT)
Dept: ONCOLOGY | Facility: HOSPITAL | Age: 79
Discharge: HOME | End: 2024-07-16
Admitting: INTERNAL MEDICINE
Payer: MEDICARE

## 2024-07-16 DIAGNOSIS — E53.8 B12 DEFICIENCY: Primary | ICD-10-CM

## 2024-07-16 PROCEDURE — 25010000002 CYANOCOBALAMIN PER 1000 MCG: Performed by: INTERNAL MEDICINE

## 2024-07-16 PROCEDURE — 96372 THER/PROPH/DIAG INJ SC/IM: CPT

## 2024-07-16 RX ORDER — CYANOCOBALAMIN 1000 UG/ML
1000 INJECTION, SOLUTION INTRAMUSCULAR; SUBCUTANEOUS
Status: DISCONTINUED | OUTPATIENT
Start: 2024-07-16 | End: 2024-07-17 | Stop reason: HOSPADM

## 2024-07-16 RX ADMIN — CYANOCOBALAMIN 1000 MCG: 1000 INJECTION INTRAMUSCULAR; SUBCUTANEOUS at 08:38

## 2024-07-30 ENCOUNTER — OFFICE VISIT (OUTPATIENT)
Dept: ONCOLOGY | Facility: CLINIC | Age: 79
End: 2024-07-30
Payer: MEDICARE

## 2024-07-30 ENCOUNTER — LAB (OUTPATIENT)
Dept: LAB | Facility: HOSPITAL | Age: 79
End: 2024-07-30
Payer: MEDICARE

## 2024-07-30 VITALS
SYSTOLIC BLOOD PRESSURE: 150 MMHG | DIASTOLIC BLOOD PRESSURE: 74 MMHG | WEIGHT: 217 LBS | HEIGHT: 68 IN | RESPIRATION RATE: 18 BRPM | BODY MASS INDEX: 32.89 KG/M2 | TEMPERATURE: 98 F | OXYGEN SATURATION: 95 % | HEART RATE: 74 BPM

## 2024-07-30 DIAGNOSIS — E53.8 B12 DEFICIENCY: Primary | ICD-10-CM

## 2024-07-30 DIAGNOSIS — D64.9 ANEMIA, UNSPECIFIED TYPE: Primary | ICD-10-CM

## 2024-07-30 DIAGNOSIS — D64.9 ANEMIA, UNSPECIFIED TYPE: ICD-10-CM

## 2024-07-30 LAB
BASOPHILS # BLD AUTO: 0.01 10*3/MM3 (ref 0–0.2)
BASOPHILS NFR BLD AUTO: 0.1 % (ref 0–1.5)
DEPRECATED RDW RBC AUTO: 45.4 FL (ref 37–54)
EOSINOPHIL # BLD AUTO: 0.15 10*3/MM3 (ref 0–0.4)
EOSINOPHIL NFR BLD AUTO: 2.2 % (ref 0.3–6.2)
ERYTHROCYTE [DISTWIDTH] IN BLOOD BY AUTOMATED COUNT: 12.9 % (ref 12.3–15.4)
HCT VFR BLD AUTO: 40.9 % (ref 37.5–51)
HGB BLD-MCNC: 13.5 G/DL (ref 13–17.7)
HOLD SPECIMEN: NORMAL
HOLD SPECIMEN: NORMAL
LYMPHOCYTES # BLD AUTO: 1.96 10*3/MM3 (ref 0.7–3.1)
LYMPHOCYTES NFR BLD AUTO: 28.2 % (ref 19.6–45.3)
MCH RBC QN AUTO: 32.6 PG (ref 26.6–33)
MCHC RBC AUTO-ENTMCNC: 33 G/DL (ref 31.5–35.7)
MCV RBC AUTO: 98.8 FL (ref 79–97)
MONOCYTES # BLD AUTO: 0.75 10*3/MM3 (ref 0.1–0.9)
MONOCYTES NFR BLD AUTO: 10.8 % (ref 5–12)
NEUTROPHILS NFR BLD AUTO: 4.07 10*3/MM3 (ref 1.7–7)
NEUTROPHILS NFR BLD AUTO: 58.7 % (ref 42.7–76)
PLATELET # BLD AUTO: 198 10*3/MM3 (ref 140–450)
PMV BLD AUTO: 8.6 FL (ref 6–12)
RBC # BLD AUTO: 4.14 10*6/MM3 (ref 4.14–5.8)
WBC NRBC COR # BLD AUTO: 6.94 10*3/MM3 (ref 3.4–10.8)

## 2024-07-30 PROCEDURE — 1126F AMNT PAIN NOTED NONE PRSNT: CPT | Performed by: INTERNAL MEDICINE

## 2024-07-30 PROCEDURE — 99214 OFFICE O/P EST MOD 30 MIN: CPT | Performed by: INTERNAL MEDICINE

## 2024-07-30 PROCEDURE — 3078F DIAST BP <80 MM HG: CPT | Performed by: INTERNAL MEDICINE

## 2024-07-30 PROCEDURE — 85025 COMPLETE CBC W/AUTO DIFF WBC: CPT

## 2024-07-30 PROCEDURE — 3077F SYST BP >= 140 MM HG: CPT | Performed by: INTERNAL MEDICINE

## 2024-07-30 PROCEDURE — 36415 COLL VENOUS BLD VENIPUNCTURE: CPT

## 2024-07-30 NOTE — PROGRESS NOTES
Hematology/Oncology Outpatient Follow Up    PATIENT NAME:Edgardo Jacobson  :1945  MRN: [id number]  PRIMARY CARE PHYSICIAN: Darren Mobley PA-C  REFERRING PHYSICIAN: No ref. provider found    Chief Complaint   Patient presents with    Follow-up     B12 deficiency            HISTORY OF PRESENT ILLNESS:     This is a 78-year-old male who was a direct admit from PCP office for severe anemia.  He reported feeling poorly with worsening dyspnea and fatigue over the past six months.  His hemoglobin was found to be 7.5 at the PCP visit, previously had been over 14.  Patient denied any signs of bleeding.  Reported abdominal bloating without melena.                  Heme/Onc was consulted for macrocytic anemia on 18.  His CBC showed WBC of 3.7, hemoglobin 7.1,  and platelet count 136,000.  Vitamin B12 was noted to be low at less than 50.  Reticulocyte was 0.74.  Patient received a total of three units of packed red blood cells in the hospital and was started on replacement therapy for B12.  He denied any recent infections or contacts with the ill, but he did complain of abdominal bloating with constipation and rectal bleeding.  Patient had CT scan of the abdomen and pelvis on 18 which was essentially unremarkable, except for diverticulosis.  Patient was seen by GI and had EGD on 18.  This showed a normal esophagus, atrophic gastritis with retained food in the stomach from breakfast six hours earlier.  Normal duodenum.  Review of his labs at the time of discharge on 10/1/18, WBC was 4.7, hemoglobin 9.7, , platelet count 135,000.  Differentials were 53% neutrophils, 23% lymphocytes.  There was no monocytosis, eosinophilia or basophilia.  Chemistry panel:  BUN 11, creatinine 1.1.  Ferritin was 213.  Iron binding capacity was low at 225.  TSH normal at 3.2.  Haptoglobin low at 28.  .  Folate was more than 24.  Hemoccult stool was negative.    2018 - B12 injections started.    Patient has no specific complaints since the last visit.  He remains on B12 injections.  Has discontinued alcohol ingestion.  Has not had any recent hospitalization.  He denies fevers, chills, nausea or vomiting.  6/8/2021: Patient here for follow up appt for vitamin b12 deficiency.    Past Medical History:   Diagnosis Date    B12 deficiency     BPH (benign prostatic hyperplasia)     Hypertension        Past Surgical History:   Procedure Laterality Date    COLONOSCOPY           Current Outpatient Medications:     amLODIPine (NORVASC) 5 MG tablet, Take 1 tablet by mouth Daily., Disp: 30 tablet, Rfl: 0    aspirin (aspirin) 81 MG EC tablet, Daily., Disp: , Rfl:     atorvastatin (LIPITOR) 10 MG tablet, , Disp: , Rfl:     Cyanocobalamin 1000 MCG/ML kit, B-12 COMPLIANCE INJECTION 1000 MCG/ML KIT, Disp: , Rfl:     lisinopril (PRINIVIL,ZESTRIL) 20 MG tablet, Daily., Disp: , Rfl:     metoprolol succinate XL (TOPROL-XL) 50 MG 24 hr tablet, Daily., Disp: , Rfl:     No Known Allergies    No family history on file.    Cancer-related family history is not on file.    Social History     Tobacco Use    Smoking status: Never    Smokeless tobacco: Never   Vaping Use    Vaping status: Never Used   Substance Use Topics    Alcohol use: No    Drug use: No       I have reviewed and confirmed the accuracy of the patient's history: Chief complaint, HPI, ROS, and Subjective as entered by the MA/SIGRIDN/RN. Zenia Camara MD 07/30/24      SUBJECTIVE:    Gene is here today for his routine follow-up.  He reports that he is doing well.  His blood pressure is up a bit at the visit today but he states that it it was good at his visit with his PCP Dr. Mobley and that is also good on his home checks.      Patient is here today for routine follow-up and does not have any new issues.  He denies changes in his energy level.  He denies any rectal bleeding.    Patient is here today for follow-up due to recent anemia.  Otherwise he denies any new  "issues      REVIEW OF SYSTEMS:    Review of Systems   Constitutional:  Negative for activity change, appetite change, chills, fatigue and fever.   HENT:  Negative for ear pain, facial swelling, hearing loss, mouth sores, nosebleeds, sinus pressure, sinus pain, sore throat and trouble swallowing.    Eyes:  Negative for photophobia and visual disturbance.   Respiratory:  Negative for cough, chest tightness, shortness of breath, wheezing and stridor.    Cardiovascular:  Negative for chest pain and palpitations.   Gastrointestinal:  Negative for abdominal pain, anal bleeding, blood in stool, constipation, diarrhea, nausea and vomiting.   Endocrine: Negative for cold intolerance and heat intolerance.   Genitourinary:  Negative for decreased urine volume, difficulty urinating, dysuria, hematuria, penile discharge, penile pain, scrotal swelling, testicular pain and urgency.   Musculoskeletal:  Negative for back pain, gait problem, joint swelling, neck pain and neck stiffness.   Skin:  Negative for color change and rash.   Allergic/Immunologic: Negative for environmental allergies and food allergies.   Neurological:  Negative for dizziness, seizures, syncope, speech difficulty, weakness, numbness and headaches.   Hematological:  Negative for adenopathy.        No obvious bleeding   Psychiatric/Behavioral:  Negative for agitation, behavioral problems, confusion, hallucinations and sleep disturbance.            OBJECTIVE:    Vitals:    07/30/24 0934   BP: 150/74   Pulse: 74   Resp: 18   Temp: 98 °F (36.7 °C)   TempSrc: Infrared   SpO2: 95%   Weight: 98.4 kg (217 lb)   Height: 172.7 cm (68\")   PainSc: 0-No pain             ECOG    (0) Fully active, able to carry on all predisease performance without restriction    Physical Exam   Constitutional: He is oriented to person, place, and time. No distress.   HENT:   Head: Normocephalic and atraumatic.   Right Ear: External ear normal.   Left Ear: External ear normal.   Nose: Nose " normal. No rhinorrhea or congestion.   Mouth/Throat: Mucous membranes are moist. No oropharyngeal exudate or posterior oropharyngeal erythema. Oropharynx is clear.   Eyes: Pupils are equal, round, and reactive to light. Conjunctivae are normal. Right eye exhibits no discharge. Left eye exhibits no discharge. No scleral icterus.   Neck: No thyromegaly present.   Cardiovascular: Normal rate, regular rhythm and normal heart sounds. Exam reveals no gallop and no friction rub.   Pulmonary/Chest: Effort normal. No stridor. No respiratory distress. He has no wheezes.   Abdominal: Soft. Bowel sounds are normal. He exhibits no distension and no mass. There is no abdominal tenderness. There is no rebound and no guarding.   Musculoskeletal: Normal range of motion. No swelling or tenderness.      Right lower leg: No edema.      Left lower leg: No edema.   Lymphadenopathy:     He has no cervical adenopathy.   Neurological: He is alert and oriented to person, place, and time. He exhibits normal muscle tone.   Skin: Skin is warm and dry. Capillary refill takes less than 2 seconds. No bruising, no lesion and no rash noted. He is not diaphoretic. No erythema. No jaundice.   Psychiatric: His behavior is normal. Mood, judgment and thought content normal.   Nursing note and vitals reviewed.    I have reexamined the patient and the results are consistent with the previously documented exam. Zenia Camara MD        RECENT LABS    WBC   Date Value Ref Range Status   07/30/2024 6.94 3.40 - 10.80 10*3/mm3 Final     RBC   Date Value Ref Range Status   07/30/2024 4.14 4.14 - 5.80 10*6/mm3 Final     Hemoglobin   Date Value Ref Range Status   07/30/2024 13.5 13.0 - 17.7 g/dL Final     Hematocrit   Date Value Ref Range Status   07/30/2024 40.9 37.5 - 51.0 % Final     MCV   Date Value Ref Range Status   07/30/2024 98.8 (H) 79.0 - 97.0 fL Final     MCH   Date Value Ref Range Status   07/30/2024 32.6 26.6 - 33.0 pg Final     MCHC   Date  Value Ref Range Status   07/30/2024 33.0 31.5 - 35.7 g/dL Final     RDW   Date Value Ref Range Status   07/30/2024 12.9 12.3 - 15.4 % Final     RDW-SD   Date Value Ref Range Status   07/30/2024 45.4 37.0 - 54.0 fl Final     MPV   Date Value Ref Range Status   07/30/2024 8.6 6.0 - 12.0 fL Final     Platelets   Date Value Ref Range Status   07/30/2024 198 140 - 450 10*3/mm3 Final     Neutrophil %   Date Value Ref Range Status   07/30/2024 58.7 42.7 - 76.0 % Final     Lymphocyte %   Date Value Ref Range Status   07/30/2024 28.2 19.6 - 45.3 % Final     Monocyte %   Date Value Ref Range Status   07/30/2024 10.8 5.0 - 12.0 % Final     Eosinophil %   Date Value Ref Range Status   07/30/2024 2.2 0.3 - 6.2 % Final     Basophil %   Date Value Ref Range Status   07/30/2024 0.1 0.0 - 1.5 % Final     Neutrophils, Absolute   Date Value Ref Range Status   07/30/2024 4.07 1.70 - 7.00 10*3/mm3 Final     Lymphocytes, Absolute   Date Value Ref Range Status   07/30/2024 1.96 0.70 - 3.10 10*3/mm3 Final     Monocytes, Absolute   Date Value Ref Range Status   07/30/2024 0.75 0.10 - 0.90 10*3/mm3 Final     Eosinophils, Absolute   Date Value Ref Range Status   07/30/2024 0.15 0.00 - 0.40 10*3/mm3 Final     Basophils, Absolute   Date Value Ref Range Status   07/30/2024 0.01 0.00 - 0.20 10*3/mm3 Final     nRBC   Date Value Ref Range Status   04/01/2019 0 0 /100[WBCs] Final       Lab Results   Component Value Date    GLUCOSE 110 (H) 04/01/2019    BUN 13 04/01/2019    CREATININE 1.0 04/01/2019    BCR 13.0 04/01/2019    K 4.4 04/01/2019    CO2 24 04/01/2019    CALCIUM 8.8 (L) 04/01/2019    PROTENTOTREF 6.3 06/18/2024    ALBUMIN 3.7 06/18/2024    LABIL2 1.4 06/18/2024    AST 23 09/29/2018    ALT 15 (L) 09/29/2018         ASSESSMENT:    Severe B12 deficiency, on B12 replacement therapy.  Continue B12 injections  Worsening anemia: Anemia workup was negative  History of heavy alcohol use.  Discontinued since 2004  Hypertension.  Managed by his  PCP  BPH.      PLANS:     His hemoglobin declined to 12.9 g per DL.  His baseline is between 14 and 15 g per DL therefore perform additional workup today to see if any additional deficiencies.  No additional deficiencies identified for now we will continue observation.  Reviewed with patient  Continue B12 injections  Hemoglobin is up to 13.5 g per DL  Patient will continue to follow-up with his PCP for elevated blood pressure  Follow-up 4 months or earlier as needed  Reviewed signs and symptoms of anemia and vitamin B12 deficiency to report between appointments       Patient verbalized understanding and is in agreement of the above plan.          I spent 30 total minutes, face-to-face, caring for Gene today. 90% of this time involved counseling and/or coordination of care as documented within this note.

## 2024-08-13 ENCOUNTER — HOSPITAL ENCOUNTER (OUTPATIENT)
Dept: ONCOLOGY | Facility: HOSPITAL | Age: 79
Discharge: HOME | End: 2024-08-13
Admitting: INTERNAL MEDICINE
Payer: MEDICARE

## 2024-08-13 DIAGNOSIS — E53.8 B12 DEFICIENCY: Primary | ICD-10-CM

## 2024-08-13 PROCEDURE — 96372 THER/PROPH/DIAG INJ SC/IM: CPT

## 2024-08-13 PROCEDURE — 25010000002 CYANOCOBALAMIN PER 1000 MCG: Performed by: INTERNAL MEDICINE

## 2024-08-13 RX ORDER — CYANOCOBALAMIN 1000 UG/ML
1000 INJECTION, SOLUTION INTRAMUSCULAR; SUBCUTANEOUS
Status: DISCONTINUED | OUTPATIENT
Start: 2024-08-13 | End: 2024-08-14 | Stop reason: HOSPADM

## 2024-08-13 RX ADMIN — CYANOCOBALAMIN 1000 MCG: 1000 INJECTION INTRAMUSCULAR; SUBCUTANEOUS at 09:17

## 2024-09-10 ENCOUNTER — HOSPITAL ENCOUNTER (OUTPATIENT)
Dept: ONCOLOGY | Facility: HOSPITAL | Age: 79
Discharge: HOME | End: 2024-09-10
Admitting: INTERNAL MEDICINE
Payer: MEDICARE

## 2024-09-10 DIAGNOSIS — E53.8 B12 DEFICIENCY: Primary | ICD-10-CM

## 2024-09-10 PROCEDURE — 25010000002 CYANOCOBALAMIN PER 1000 MCG: Performed by: INTERNAL MEDICINE

## 2024-09-10 PROCEDURE — 96372 THER/PROPH/DIAG INJ SC/IM: CPT

## 2024-09-10 RX ORDER — CYANOCOBALAMIN 1000 UG/ML
1000 INJECTION, SOLUTION INTRAMUSCULAR; SUBCUTANEOUS
Status: DISCONTINUED | OUTPATIENT
Start: 2024-09-10 | End: 2024-09-11 | Stop reason: HOSPADM

## 2024-09-10 RX ADMIN — CYANOCOBALAMIN 1000 MCG: 1000 INJECTION, SOLUTION INTRAMUSCULAR at 09:00

## 2024-10-08 ENCOUNTER — HOSPITAL ENCOUNTER (OUTPATIENT)
Dept: ONCOLOGY | Facility: HOSPITAL | Age: 79
Discharge: HOME | End: 2024-10-08
Admitting: INTERNAL MEDICINE
Payer: MEDICARE

## 2024-10-08 DIAGNOSIS — E53.8 B12 DEFICIENCY: Primary | ICD-10-CM

## 2024-10-08 PROCEDURE — 25010000002 CYANOCOBALAMIN PER 1000 MCG: Performed by: INTERNAL MEDICINE

## 2024-10-08 PROCEDURE — 96372 THER/PROPH/DIAG INJ SC/IM: CPT

## 2024-10-08 RX ORDER — CYANOCOBALAMIN 1000 UG/ML
1000 INJECTION, SOLUTION INTRAMUSCULAR; SUBCUTANEOUS
Status: DISCONTINUED | OUTPATIENT
Start: 2024-10-08 | End: 2024-10-09 | Stop reason: HOSPADM

## 2024-10-08 RX ADMIN — CYANOCOBALAMIN 1000 MCG: 1000 INJECTION INTRAMUSCULAR; SUBCUTANEOUS at 09:09

## 2024-10-08 NOTE — ADDENDUM NOTE
Encounter addended by: Mili Champion RN on: 10/8/2024 9:15 AM   Actions taken: Treatment plan modified

## 2024-11-05 ENCOUNTER — HOSPITAL ENCOUNTER (OUTPATIENT)
Dept: ONCOLOGY | Facility: HOSPITAL | Age: 79
Discharge: HOME | End: 2024-11-05
Admitting: INTERNAL MEDICINE
Payer: MEDICARE

## 2024-11-05 DIAGNOSIS — E53.8 B12 DEFICIENCY: Primary | ICD-10-CM

## 2024-11-05 PROCEDURE — 96372 THER/PROPH/DIAG INJ SC/IM: CPT

## 2024-11-05 PROCEDURE — 25010000002 CYANOCOBALAMIN PER 1000 MCG: Performed by: INTERNAL MEDICINE

## 2024-11-05 RX ORDER — CYANOCOBALAMIN 1000 UG/ML
1000 INJECTION, SOLUTION INTRAMUSCULAR; SUBCUTANEOUS
Status: DISCONTINUED | OUTPATIENT
Start: 2024-11-05 | End: 2024-11-06 | Stop reason: HOSPADM

## 2024-11-05 RX ADMIN — CYANOCOBALAMIN 1000 MCG: 1000 INJECTION, SOLUTION INTRAMUSCULAR at 09:06

## 2024-12-03 ENCOUNTER — LAB (OUTPATIENT)
Dept: LAB | Facility: HOSPITAL | Age: 79
End: 2024-12-03
Payer: MEDICARE

## 2024-12-03 ENCOUNTER — HOSPITAL ENCOUNTER (OUTPATIENT)
Dept: ONCOLOGY | Facility: HOSPITAL | Age: 79
Discharge: HOME | End: 2024-12-03
Payer: MEDICARE

## 2024-12-03 ENCOUNTER — OFFICE VISIT (OUTPATIENT)
Dept: ONCOLOGY | Facility: CLINIC | Age: 79
End: 2024-12-03
Payer: MEDICARE

## 2024-12-03 VITALS
OXYGEN SATURATION: 96 % | WEIGHT: 221 LBS | HEART RATE: 68 BPM | BODY MASS INDEX: 33.49 KG/M2 | SYSTOLIC BLOOD PRESSURE: 168 MMHG | HEIGHT: 68 IN | TEMPERATURE: 98 F | DIASTOLIC BLOOD PRESSURE: 78 MMHG

## 2024-12-03 DIAGNOSIS — E53.8 B12 DEFICIENCY: ICD-10-CM

## 2024-12-03 DIAGNOSIS — D75.89 MACROCYTOSIS: ICD-10-CM

## 2024-12-03 DIAGNOSIS — D64.9 ANEMIA, UNSPECIFIED TYPE: Primary | ICD-10-CM

## 2024-12-03 DIAGNOSIS — E53.8 B12 DEFICIENCY: Primary | ICD-10-CM

## 2024-12-03 LAB
BASOPHILS # BLD AUTO: 0.01 10*3/MM3 (ref 0–0.2)
BASOPHILS NFR BLD AUTO: 0.2 % (ref 0–1.5)
DEPRECATED RDW RBC AUTO: 47.4 FL (ref 37–54)
EOSINOPHIL # BLD AUTO: 0.2 10*3/MM3 (ref 0–0.4)
EOSINOPHIL NFR BLD AUTO: 3.5 % (ref 0.3–6.2)
ERYTHROCYTE [DISTWIDTH] IN BLOOD BY AUTOMATED COUNT: 12.9 % (ref 12.3–15.4)
FOLATE SERPL-MCNC: 13.8 NG/ML (ref 4.78–24.2)
HCT VFR BLD AUTO: 41.3 % (ref 37.5–51)
HGB BLD-MCNC: 13.4 G/DL (ref 13–17.7)
HOLD SPECIMEN: NORMAL
LYMPHOCYTES # BLD AUTO: 1.76 10*3/MM3 (ref 0.7–3.1)
LYMPHOCYTES NFR BLD AUTO: 30.7 % (ref 19.6–45.3)
MCH RBC QN AUTO: 33 PG (ref 26.6–33)
MCHC RBC AUTO-ENTMCNC: 32.4 G/DL (ref 31.5–35.7)
MCV RBC AUTO: 101.7 FL (ref 79–97)
MONOCYTES # BLD AUTO: 0.57 10*3/MM3 (ref 0.1–0.9)
MONOCYTES NFR BLD AUTO: 9.9 % (ref 5–12)
NEUTROPHILS NFR BLD AUTO: 3.2 10*3/MM3 (ref 1.7–7)
NEUTROPHILS NFR BLD AUTO: 55.7 % (ref 42.7–76)
PLATELET # BLD AUTO: 174 10*3/MM3 (ref 140–450)
PMV BLD AUTO: 8.6 FL (ref 6–12)
RBC # BLD AUTO: 4.06 10*6/MM3 (ref 4.14–5.8)
VIT B12 BLD-MCNC: 324 PG/ML (ref 211–946)
WBC NRBC COR # BLD AUTO: 5.74 10*3/MM3 (ref 3.4–10.8)

## 2024-12-03 PROCEDURE — 85025 COMPLETE CBC W/AUTO DIFF WBC: CPT

## 2024-12-03 PROCEDURE — 25010000002 CYANOCOBALAMIN PER 1000 MCG: Performed by: PHYSICIAN ASSISTANT

## 2024-12-03 PROCEDURE — 36415 COLL VENOUS BLD VENIPUNCTURE: CPT

## 2024-12-03 PROCEDURE — 96372 THER/PROPH/DIAG INJ SC/IM: CPT

## 2024-12-03 PROCEDURE — 82746 ASSAY OF FOLIC ACID SERUM: CPT | Performed by: PHYSICIAN ASSISTANT

## 2024-12-03 PROCEDURE — 82607 VITAMIN B-12: CPT | Performed by: PHYSICIAN ASSISTANT

## 2024-12-03 RX ORDER — CYANOCOBALAMIN 1000 UG/ML
1000 INJECTION, SOLUTION INTRAMUSCULAR; SUBCUTANEOUS
Status: CANCELLED | OUTPATIENT
Start: 2024-12-03

## 2024-12-03 RX ORDER — CYANOCOBALAMIN 1000 UG/ML
1000 INJECTION, SOLUTION INTRAMUSCULAR; SUBCUTANEOUS
Status: DISCONTINUED | OUTPATIENT
Start: 2024-12-03 | End: 2024-12-04 | Stop reason: HOSPADM

## 2024-12-03 RX ADMIN — CYANOCOBALAMIN 1000 MCG: 1000 INJECTION, SOLUTION INTRAMUSCULAR at 09:29

## 2024-12-03 NOTE — PROGRESS NOTES
Hematology/Oncology Outpatient Follow Up    PATIENT NAME:Edgardo Jacobson  :1945  MRN: [id number]  PRIMARY CARE PHYSICIAN: Darren Mobley PA-C  REFERRING PHYSICIAN: Darren Mobley PA-C    Chief Complaint   Patient presents with    Follow-up     Anemia, unspecified type        HISTORY OF PRESENT ILLNESS:     This is a 78-year-old male who was a direct admit from PCP office for severe anemia.  He reported feeling poorly with worsening dyspnea and fatigue over the past six months.  His hemoglobin was found to be 7.5 at the PCP visit, previously had been over 14.  Patient denied any signs of bleeding.  Reported abdominal bloating without melena.                  Heme/Onc was consulted for macrocytic anemia on 18.  His CBC showed WBC of 3.7, hemoglobin 7.1,  and platelet count 136,000.  Vitamin B12 was noted to be low at less than 50.  Reticulocyte was 0.74.  Patient received a total of three units of packed red blood cells in the hospital and was started on replacement therapy for B12.  He denied any recent infections or contacts with the ill, but he did complain of abdominal bloating with constipation and rectal bleeding.  Patient had CT scan of the abdomen and pelvis on 18 which was essentially unremarkable, except for diverticulosis.  Patient was seen by GI and had EGD on 18.  This showed a normal esophagus, atrophic gastritis with retained food in the stomach from breakfast six hours earlier.  Normal duodenum.  Review of his labs at the time of discharge on 10/1/18, WBC was 4.7, hemoglobin 9.7, , platelet count 135,000.  Differentials were 53% neutrophils, 23% lymphocytes.  There was no monocytosis, eosinophilia or basophilia.  Chemistry panel:  BUN 11, creatinine 1.1.  Ferritin was 213.  Iron binding capacity was low at 225.  TSH normal at 3.2.  Haptoglobin low at 28.  .  Folate was more than 24.  Hemoccult stool was negative.    2018 - B12 injections  started.   Patient has no specific complaints since the last visit.  He remains on B12 injections.  Has discontinued alcohol ingestion.  Has not had any recent hospitalization.  He denies fevers, chills, nausea or vomiting.  6/8/2021: Patient here for follow up appt for vitamin b12 deficiency.    Past Medical History:   Diagnosis Date    B12 deficiency     BPH (benign prostatic hyperplasia)     Hypertension        Past Surgical History:   Procedure Laterality Date    COLONOSCOPY           Current Outpatient Medications:     amLODIPine (NORVASC) 5 MG tablet, Take 1 tablet by mouth Daily., Disp: 30 tablet, Rfl: 0    aspirin (aspirin) 81 MG EC tablet, Daily., Disp: , Rfl:     atorvastatin (LIPITOR) 10 MG tablet, , Disp: , Rfl:     Cyanocobalamin 1000 MCG/ML kit, B-12 COMPLIANCE INJECTION 1000 MCG/ML KIT, Disp: , Rfl:     lisinopril (PRINIVIL,ZESTRIL) 20 MG tablet, Daily., Disp: , Rfl:     metoprolol succinate XL (TOPROL-XL) 50 MG 24 hr tablet, Daily., Disp: , Rfl:   No current facility-administered medications for this visit.    Facility-Administered Medications Ordered in Other Visits:     cyanocobalamin injection 1,000 mcg, 1,000 mcg, Intramuscular, Q28 Days, Meron Dietz PA-C    No Known Allergies    No family history on file.    Cancer-related family history is not on file.    Social History     Tobacco Use    Smoking status: Never    Smokeless tobacco: Never   Vaping Use    Vaping status: Never Used   Substance Use Topics    Alcohol use: No    Drug use: No       I have reviewed and confirmed the accuracy of the patient's history: Chief complaint, HPI, ROS, and Subjective as entered by the MA/LPN/RN. 12/03/24      SUBJECTIVE:    Gene is here today for his routine follow-up.  He reports that he is doing well.  His blood pressure is up a bit at the visit today but he states that it it was good at his visit with his PCP Dr. Mobley and that is also good on his home checks.      Patient is here today for routine  "follow-up and does not have any new issues.  He denies changes in his energy level.  He denies any rectal bleeding.    Patient is here today for follow-up due to recent anemia.  Otherwise he denies any new issues    12/3/2024: Gene is here today for his routine follow-up.  Continues to monitor his blood pressure at home and continues to follow regularly with his PCP.  He has no new issues today.  He denies increased fatigue or changes in his appetite.  He denies any bleeding concerns.      REVIEW OF SYSTEMS:    Review of Systems   Constitutional:  Negative for chills, fatigue, fever and unexpected weight change.   HENT:  Negative for ear pain, mouth sores, nosebleeds and sore throat.    Eyes:  Negative for photophobia and visual disturbance.   Respiratory:  Negative for cough, choking, shortness of breath, wheezing and stridor.    Cardiovascular:  Negative for chest pain and palpitations.   Gastrointestinal:  Negative for abdominal pain, blood in stool, constipation, diarrhea, nausea and vomiting.   Endocrine: Negative for cold intolerance and heat intolerance.   Genitourinary:  Negative for dysuria and hematuria.   Musculoskeletal:  Negative for joint swelling and neck stiffness.   Skin:  Negative for color change, pallor and rash.   Neurological:  Negative for seizures, syncope and weakness.   Hematological:  Negative for adenopathy. Does not bruise/bleed easily.        No obvious bleeding   Psychiatric/Behavioral:  Negative for agitation, confusion and hallucinations.            OBJECTIVE:    Vitals:    12/03/24 0849   BP: 168/78   Pulse: 68   Temp: 98 °F (36.7 °C)   SpO2: 96%   Weight: 100 kg (221 lb)   Height: 172.7 cm (67.99\")   PainSc: 0-No pain               ECOG  (1) Restricted in physically strenuous activity, ambulatory and able to do work of light nature      Physical Exam   Constitutional: He is oriented to person, place, and time. He does not appear ill.   HENT:   Head: Normocephalic and atraumatic. " Mouth/Throat: Mucous membranes are moist. No oropharyngeal exudate.   Eyes: Pupils are equal, round, and reactive to light. Conjunctivae are normal. No scleral icterus.   Cardiovascular: Normal rate.   Pulmonary/Chest: Effort normal and breath sounds normal. No respiratory distress. He has no wheezes. He has no rhonchi.   Abdominal: Soft. There is no abdominal tenderness.   Protuberant abdomen but soft and nontender to palpation   Neurological: He is alert and oriented to person, place, and time. He displays no weakness.   Skin: No bruising, no lesion and no rash noted. No erythema. No jaundice or pallor.   Psychiatric: His behavior is normal. Mood and thought content normal.   Vitals reviewed.    I have reexamined the patient and the results are consistent with the previously documented exam.   RECENT LABS    WBC   Date Value Ref Range Status   12/03/2024 5.74 3.40 - 10.80 10*3/mm3 Final     RBC   Date Value Ref Range Status   12/03/2024 4.06 (L) 4.14 - 5.80 10*6/mm3 Final     Hemoglobin   Date Value Ref Range Status   12/03/2024 13.4 13.0 - 17.7 g/dL Final     Hematocrit   Date Value Ref Range Status   12/03/2024 41.3 37.5 - 51.0 % Final     MCV   Date Value Ref Range Status   12/03/2024 101.7 (H) 79.0 - 97.0 fL Final     MCH   Date Value Ref Range Status   12/03/2024 33.0 26.6 - 33.0 pg Final     MCHC   Date Value Ref Range Status   12/03/2024 32.4 31.5 - 35.7 g/dL Final     RDW   Date Value Ref Range Status   12/03/2024 12.9 12.3 - 15.4 % Final     RDW-SD   Date Value Ref Range Status   12/03/2024 47.4 37.0 - 54.0 fl Final     MPV   Date Value Ref Range Status   12/03/2024 8.6 6.0 - 12.0 fL Final     Platelets   Date Value Ref Range Status   12/03/2024 174 140 - 450 10*3/mm3 Final     Neutrophil %   Date Value Ref Range Status   12/03/2024 55.7 42.7 - 76.0 % Final     Lymphocyte %   Date Value Ref Range Status   12/03/2024 30.7 19.6 - 45.3 % Final     Monocyte %   Date Value Ref Range Status   12/03/2024 9.9 5.0  - 12.0 % Final     Eosinophil %   Date Value Ref Range Status   12/03/2024 3.5 0.3 - 6.2 % Final     Basophil %   Date Value Ref Range Status   12/03/2024 0.2 0.0 - 1.5 % Final     Neutrophils, Absolute   Date Value Ref Range Status   12/03/2024 3.20 1.70 - 7.00 10*3/mm3 Final     Lymphocytes, Absolute   Date Value Ref Range Status   12/03/2024 1.76 0.70 - 3.10 10*3/mm3 Final     Monocytes, Absolute   Date Value Ref Range Status   12/03/2024 0.57 0.10 - 0.90 10*3/mm3 Final     Eosinophils, Absolute   Date Value Ref Range Status   12/03/2024 0.20 0.00 - 0.40 10*3/mm3 Final     Basophils, Absolute   Date Value Ref Range Status   12/03/2024 0.01 0.00 - 0.20 10*3/mm3 Final     nRBC   Date Value Ref Range Status   04/01/2019 0 0 /100[WBCs] Final       Lab Results   Component Value Date    GLUCOSE 110 (H) 04/01/2019    BUN 13 04/01/2019    CREATININE 1.0 04/01/2019    BCR 13.0 04/01/2019    K 4.4 04/01/2019    CO2 24 04/01/2019    CALCIUM 8.8 (L) 04/01/2019    PROTENTOTREF 6.3 06/18/2024    ALBUMIN 3.7 06/18/2024    LABIL2 1.4 06/18/2024    AST 23 09/29/2018    ALT 15 (L) 09/29/2018         ASSESSMENT:    Severe B12 deficiency, on B12 replacement therapy.  Continue B12 injections  Worsening macrocytic anemia: Anemia workup was negative. Continue to monitor  History of heavy alcohol use.  Discontinued since 2004  Hypertension.  Managed by his PCP  BPH.      PLANS:      His baseline is between 14 and 15 g per DL therefore perform additional workup today to see if any additional deficiencies.  No additional deficiencies identified for now we will continue observation.  Reviewed with patient  Reviewed CBC, hemoglobin stable 13.4 g/dL.   Continue B12 injections  Patient will continue to follow-up with his PCP for elevated blood pressure  Reviewed signs and symptoms of anemia and vitamin B12 deficiency to report between appointments  Follow-up with Dr. Camara in 4 months, sooner if condition indicates    I have answered all his  questions to his satisfaction.     I have reviewed labs results, imaging, vitals, and medications with the patient today.       Patient verbalized understanding and is in agreement of the above plan.      Electronically signed by Meron Dietz PA-C

## 2025-01-28 ENCOUNTER — HOSPITAL ENCOUNTER (OUTPATIENT)
Dept: ONCOLOGY | Facility: HOSPITAL | Age: 80
Discharge: HOME | End: 2025-01-28
Admitting: INTERNAL MEDICINE
Payer: MEDICARE

## 2025-01-28 DIAGNOSIS — E53.8 B12 DEFICIENCY: Primary | ICD-10-CM

## 2025-01-28 PROCEDURE — 96372 THER/PROPH/DIAG INJ SC/IM: CPT

## 2025-01-28 PROCEDURE — 25010000002 CYANOCOBALAMIN PER 1000 MCG: Performed by: INTERNAL MEDICINE

## 2025-01-28 RX ORDER — CYANOCOBALAMIN 1000 UG/ML
1000 INJECTION, SOLUTION INTRAMUSCULAR; SUBCUTANEOUS
Status: DISCONTINUED | OUTPATIENT
Start: 2025-01-28 | End: 2025-01-29 | Stop reason: HOSPADM

## 2025-01-28 RX ADMIN — CYANOCOBALAMIN 1000 MCG: 1000 INJECTION, SOLUTION INTRAMUSCULAR at 09:07

## 2025-02-25 ENCOUNTER — HOSPITAL ENCOUNTER (OUTPATIENT)
Dept: ONCOLOGY | Facility: HOSPITAL | Age: 80
Discharge: HOME | End: 2025-02-25
Admitting: INTERNAL MEDICINE
Payer: MEDICARE

## 2025-02-25 DIAGNOSIS — E53.8 B12 DEFICIENCY: Primary | ICD-10-CM

## 2025-02-25 PROCEDURE — 96372 THER/PROPH/DIAG INJ SC/IM: CPT

## 2025-02-25 PROCEDURE — 25010000002 CYANOCOBALAMIN PER 1000 MCG: Performed by: INTERNAL MEDICINE

## 2025-02-25 RX ORDER — CYANOCOBALAMIN 1000 UG/ML
1000 INJECTION, SOLUTION INTRAMUSCULAR; SUBCUTANEOUS
Status: DISCONTINUED | OUTPATIENT
Start: 2025-02-25 | End: 2025-02-26 | Stop reason: HOSPADM

## 2025-02-25 RX ADMIN — CYANOCOBALAMIN 1000 MCG: 1000 INJECTION, SOLUTION INTRAMUSCULAR at 09:14

## 2025-02-25 NOTE — ADDENDUM NOTE
Encounter addended by: Mili Champion RN on: 2/25/2025 9:21 AM   Actions taken: Treatment plan modified

## 2025-03-25 ENCOUNTER — OFFICE VISIT (OUTPATIENT)
Dept: ONCOLOGY | Facility: CLINIC | Age: 80
End: 2025-03-25
Payer: MEDICARE

## 2025-03-25 ENCOUNTER — HOSPITAL ENCOUNTER (OUTPATIENT)
Dept: ONCOLOGY | Facility: HOSPITAL | Age: 80
Discharge: HOME | End: 2025-03-25
Payer: MEDICARE

## 2025-03-25 ENCOUNTER — LAB (OUTPATIENT)
Dept: LAB | Facility: HOSPITAL | Age: 80
End: 2025-03-25
Payer: MEDICARE

## 2025-03-25 VITALS
HEART RATE: 71 BPM | DIASTOLIC BLOOD PRESSURE: 81 MMHG | BODY MASS INDEX: 33.95 KG/M2 | SYSTOLIC BLOOD PRESSURE: 174 MMHG | RESPIRATION RATE: 20 BRPM | OXYGEN SATURATION: 94 % | WEIGHT: 224 LBS | HEIGHT: 68 IN | TEMPERATURE: 98.3 F

## 2025-03-25 DIAGNOSIS — D64.9 ANEMIA, UNSPECIFIED TYPE: Primary | ICD-10-CM

## 2025-03-25 DIAGNOSIS — E53.8 B12 DEFICIENCY: Primary | ICD-10-CM

## 2025-03-25 DIAGNOSIS — E53.8 B12 DEFICIENCY: ICD-10-CM

## 2025-03-25 DIAGNOSIS — D64.9 ANEMIA, UNSPECIFIED TYPE: ICD-10-CM

## 2025-03-25 LAB
BASOPHILS # BLD AUTO: 0.01 10*3/MM3 (ref 0–0.2)
BASOPHILS NFR BLD AUTO: 0.2 % (ref 0–1.5)
DEPRECATED RDW RBC AUTO: 45.2 FL (ref 37–54)
EOSINOPHIL # BLD AUTO: 0.2 10*3/MM3 (ref 0–0.4)
EOSINOPHIL NFR BLD AUTO: 3.4 % (ref 0.3–6.2)
ERYTHROCYTE [DISTWIDTH] IN BLOOD BY AUTOMATED COUNT: 12.7 % (ref 12.3–15.4)
HCT VFR BLD AUTO: 41.2 % (ref 37.5–51)
HGB BLD-MCNC: 13.7 G/DL (ref 13–17.7)
HOLD SPECIMEN: NORMAL
LYMPHOCYTES # BLD AUTO: 1.78 10*3/MM3 (ref 0.7–3.1)
LYMPHOCYTES NFR BLD AUTO: 30.1 % (ref 19.6–45.3)
MCH RBC QN AUTO: 33.2 PG (ref 26.6–33)
MCHC RBC AUTO-ENTMCNC: 33.3 G/DL (ref 31.5–35.7)
MCV RBC AUTO: 99.8 FL (ref 79–97)
MONOCYTES # BLD AUTO: 0.66 10*3/MM3 (ref 0.1–0.9)
MONOCYTES NFR BLD AUTO: 11.2 % (ref 5–12)
NEUTROPHILS NFR BLD AUTO: 3.26 10*3/MM3 (ref 1.7–7)
NEUTROPHILS NFR BLD AUTO: 55.1 % (ref 42.7–76)
PLATELET # BLD AUTO: 163 10*3/MM3 (ref 140–450)
PMV BLD AUTO: 9.5 FL (ref 6–12)
RBC # BLD AUTO: 4.13 10*6/MM3 (ref 4.14–5.8)
WBC NRBC COR # BLD AUTO: 5.91 10*3/MM3 (ref 3.4–10.8)

## 2025-03-25 PROCEDURE — 1126F AMNT PAIN NOTED NONE PRSNT: CPT | Performed by: INTERNAL MEDICINE

## 2025-03-25 PROCEDURE — 36415 COLL VENOUS BLD VENIPUNCTURE: CPT

## 2025-03-25 PROCEDURE — 99213 OFFICE O/P EST LOW 20 MIN: CPT | Performed by: INTERNAL MEDICINE

## 2025-03-25 PROCEDURE — 96372 THER/PROPH/DIAG INJ SC/IM: CPT

## 2025-03-25 PROCEDURE — 3077F SYST BP >= 140 MM HG: CPT | Performed by: INTERNAL MEDICINE

## 2025-03-25 PROCEDURE — 3079F DIAST BP 80-89 MM HG: CPT | Performed by: INTERNAL MEDICINE

## 2025-03-25 PROCEDURE — 85025 COMPLETE CBC W/AUTO DIFF WBC: CPT

## 2025-03-25 PROCEDURE — 25010000002 CYANOCOBALAMIN PER 1000 MCG: Performed by: INTERNAL MEDICINE

## 2025-03-25 RX ORDER — CYANOCOBALAMIN 1000 UG/ML
1000 INJECTION, SOLUTION INTRAMUSCULAR; SUBCUTANEOUS
Status: DISCONTINUED | OUTPATIENT
Start: 2025-03-25 | End: 2025-03-26 | Stop reason: HOSPADM

## 2025-03-25 RX ADMIN — CYANOCOBALAMIN 1000 MCG: 1000 INJECTION, SOLUTION INTRAMUSCULAR at 10:13

## 2025-03-25 NOTE — PROGRESS NOTES
Hematology/Oncology Outpatient Follow Up    PATIENT NAME:Edgardo Jacobson  :1945  MRN: [id number]  PRIMARY CARE PHYSICIAN: Darren Mobley PA-C  REFERRING PHYSICIAN: Darren Mobley PA-C    Chief Complaint   Patient presents with    Follow-up     Anemia, unspecified type            HISTORY OF PRESENT ILLNESS:     This is a 78-year-old male who was a direct admit from PCP office for severe anemia.  He reported feeling poorly with worsening dyspnea and fatigue over the past six months.  His hemoglobin was found to be 7.5 at the PCP visit, previously had been over 14.  Patient denied any signs of bleeding.  Reported abdominal bloating without melena.                  Heme/Onc was consulted for macrocytic anemia on 18.  His CBC showed WBC of 3.7, hemoglobin 7.1,  and platelet count 136,000.  Vitamin B12 was noted to be low at less than 50.  Reticulocyte was 0.74.  Patient received a total of three units of packed red blood cells in the hospital and was started on replacement therapy for B12.  He denied any recent infections or contacts with the ill, but he did complain of abdominal bloating with constipation and rectal bleeding.  Patient had CT scan of the abdomen and pelvis on 18 which was essentially unremarkable, except for diverticulosis.  Patient was seen by GI and had EGD on 18.  This showed a normal esophagus, atrophic gastritis with retained food in the stomach from breakfast six hours earlier.  Normal duodenum.  Review of his labs at the time of discharge on 10/1/18, WBC was 4.7, hemoglobin 9.7, , platelet count 135,000.  Differentials were 53% neutrophils, 23% lymphocytes.  There was no monocytosis, eosinophilia or basophilia.  Chemistry panel:  BUN 11, creatinine 1.1.  Ferritin was 213.  Iron binding capacity was low at 225.  TSH normal at 3.2.  Haptoglobin low at 28.  .  Folate was more than 24.  Hemoccult stool was negative.    2018 - B12 injections  started.   Patient has no specific complaints since the last visit.  He remains on B12 injections.  Has discontinued alcohol ingestion.  Has not had any recent hospitalization.  He denies fevers, chills, nausea or vomiting.  6/8/2021: Patient here for follow up appt for vitamin b12 deficiency.    Past Medical History:   Diagnosis Date    B12 deficiency     BPH (benign prostatic hyperplasia)     Hypertension        Past Surgical History:   Procedure Laterality Date    COLONOSCOPY           Current Outpatient Medications:     amLODIPine (NORVASC) 5 MG tablet, Take 1 tablet by mouth Daily., Disp: 30 tablet, Rfl: 0    aspirin (aspirin) 81 MG EC tablet, Daily., Disp: , Rfl:     atorvastatin (LIPITOR) 10 MG tablet, , Disp: , Rfl:     Cyanocobalamin 1000 MCG/ML kit, B-12 COMPLIANCE INJECTION 1000 MCG/ML KIT, Disp: , Rfl:     lisinopril (PRINIVIL,ZESTRIL) 20 MG tablet, Daily., Disp: , Rfl:     metoprolol succinate XL (TOPROL-XL) 50 MG 24 hr tablet, Daily., Disp: , Rfl:   No current facility-administered medications for this visit.    Facility-Administered Medications Ordered in Other Visits:     cyanocobalamin injection 1,000 mcg, 1,000 mcg, Intramuscular, Q28 Days, Zenia Camara MD, 1,000 mcg at 03/25/25 1013    No Known Allergies    No family history on file.    Cancer-related family history is not on file.    Social History     Tobacco Use    Smoking status: Never    Smokeless tobacco: Never   Vaping Use    Vaping status: Never Used   Substance Use Topics    Alcohol use: No    Drug use: No       I have reviewed and confirmed the accuracy of the patient's history: Chief complaint, HPI, ROS, and Subjective as entered by the MA/LPN/RN. Zenia Camara MD 03/25/25  3/25/25      SUBJECTIVE:    Edgardo is here today for his routine follow-up.  He reports that he is doing well.  His blood pressure is up a bit at the visit today but he states that it it was good at his visit with his PCP Dr. Mobley and that is also  "good on his home checks.      Patient is here today for routine follow-up and does not have any new issues.  He denies changes in his energy level.  He denies any rectal bleeding.    Patient is here today for follow-up due to recent anemia.  Otherwise he denies any new issues    12/3/2024: Gene is here today for his routine follow-up.  Continues to monitor his blood pressure at home and continues to follow regularly with his PCP.  He has no new issues today.  He denies increased fatigue or changes in his appetite.  He denies any bleeding concerns.    Denies any new issues.  He is here today for follow-up.      REVIEW OF SYSTEMS:    Review of Systems   Constitutional:  Negative for chills, fatigue, fever and unexpected weight change.   HENT:  Negative for ear pain, mouth sores, nosebleeds and sore throat.    Eyes:  Negative for photophobia and visual disturbance.   Respiratory:  Negative for cough, choking, shortness of breath, wheezing and stridor.    Cardiovascular:  Negative for chest pain and palpitations.   Gastrointestinal:  Negative for abdominal pain, blood in stool, constipation, diarrhea, nausea and vomiting.   Endocrine: Negative for cold intolerance and heat intolerance.   Genitourinary:  Negative for dysuria and hematuria.   Musculoskeletal:  Negative for joint swelling and neck stiffness.   Skin:  Negative for color change, pallor and rash.   Neurological:  Negative for seizures, syncope and weakness.   Hematological:  Negative for adenopathy. Does not bruise/bleed easily.        No obvious bleeding   Psychiatric/Behavioral:  Negative for agitation, confusion and hallucinations.        OBJECTIVE:    Vitals:    03/25/25 1043   BP: 174/81   Pulse: 71   Resp: 20   Temp: 98.3 °F (36.8 °C)   TempSrc: Infrared   SpO2: 94%   Weight: 102 kg (224 lb)   Height: 172.7 cm (68\")   PainSc: 0-No pain             ECOG  (1) Restricted in physically strenuous activity, ambulatory and able to do work of light " nature      Physical Exam   Constitutional: He is oriented to person, place, and time. He does not appear ill.   HENT:   Head: Normocephalic and atraumatic. Mouth/Throat: Mucous membranes are moist. No oropharyngeal exudate.   Eyes: Pupils are equal, round, and reactive to light. Conjunctivae are normal. No scleral icterus.   Cardiovascular: Normal rate.   Pulmonary/Chest: Effort normal and breath sounds normal. No respiratory distress. He has no wheezes. He has no rhonchi.   Abdominal: Soft. There is no abdominal tenderness.   Protuberant abdomen but soft and nontender to palpation   Neurological: He is alert and oriented to person, place, and time. He displays no weakness.   Skin: No bruising, no lesion and no rash noted. No erythema. No jaundice or pallor.   Psychiatric: His behavior is normal. Mood and thought content normal.   Vitals reviewed.    I have reexamined the patient and the results are consistent with the previously documented exam. Zenia Camara MD   3/25/25        RECENT LABS    WBC   Date Value Ref Range Status   03/25/2025 5.91 3.40 - 10.80 10*3/mm3 Final     RBC   Date Value Ref Range Status   03/25/2025 4.13 (L) 4.14 - 5.80 10*6/mm3 Final     Hemoglobin   Date Value Ref Range Status   03/25/2025 13.7 13.0 - 17.7 g/dL Final     Hematocrit   Date Value Ref Range Status   03/25/2025 41.2 37.5 - 51.0 % Final     MCV   Date Value Ref Range Status   03/25/2025 99.8 (H) 79.0 - 97.0 fL Final     MCH   Date Value Ref Range Status   03/25/2025 33.2 (H) 26.6 - 33.0 pg Final     MCHC   Date Value Ref Range Status   03/25/2025 33.3 31.5 - 35.7 g/dL Final     RDW   Date Value Ref Range Status   03/25/2025 12.7 12.3 - 15.4 % Final     RDW-SD   Date Value Ref Range Status   03/25/2025 45.2 37.0 - 54.0 fl Final     MPV   Date Value Ref Range Status   03/25/2025 9.5 6.0 - 12.0 fL Final     Platelets   Date Value Ref Range Status   03/25/2025 163 140 - 450 10*3/mm3 Final     Neutrophil %   Date Value Ref  Range Status   03/25/2025 55.1 42.7 - 76.0 % Final     Lymphocyte %   Date Value Ref Range Status   03/25/2025 30.1 19.6 - 45.3 % Final     Monocyte %   Date Value Ref Range Status   03/25/2025 11.2 5.0 - 12.0 % Final     Eosinophil %   Date Value Ref Range Status   03/25/2025 3.4 0.3 - 6.2 % Final     Basophil %   Date Value Ref Range Status   03/25/2025 0.2 0.0 - 1.5 % Final     Neutrophils, Absolute   Date Value Ref Range Status   03/25/2025 3.26 1.70 - 7.00 10*3/mm3 Final     Lymphocytes, Absolute   Date Value Ref Range Status   03/25/2025 1.78 0.70 - 3.10 10*3/mm3 Final     Monocytes, Absolute   Date Value Ref Range Status   03/25/2025 0.66 0.10 - 0.90 10*3/mm3 Final     Eosinophils, Absolute   Date Value Ref Range Status   03/25/2025 0.20 0.00 - 0.40 10*3/mm3 Final     Basophils, Absolute   Date Value Ref Range Status   03/25/2025 0.01 0.00 - 0.20 10*3/mm3 Final     nRBC   Date Value Ref Range Status   04/01/2019 0 0 /100[WBCs] Final       Lab Results   Component Value Date    GLUCOSE 110 (H) 04/01/2019    BUN 13 04/01/2019    CREATININE 1.0 04/01/2019    BCR 13.0 04/01/2019    K 4.4 04/01/2019    CO2 24 04/01/2019    CALCIUM 8.8 (L) 04/01/2019    ALBUMIN 3.7 06/18/2024    AST 23 09/29/2018    ALT 15 (L) 09/29/2018         ASSESSMENT:    Severe B12 deficiency, on B12 replacement therapy.  B12 injections  Worsening macrocytic anemia: Anemia workup was negative. Continue to monitor CBC reviewed  History of heavy alcohol use.  Discontinued since 2004  Hypertension.  Managed by his PCP  BPH.      PLANS:      His baseline is between 14 and 15 g per DL therefore perform additional workup today to see if any additional deficiencies.  No additional deficiencies identified for now we will continue observation.  Reviewed with patient  Reviewed CBC, hemoglobin stable 13.4 g/dL.   Continue B12 injections  Patient will continue to follow-up with his PCP for elevated blood pressure  Reviewed signs and symptoms of anemia and  vitamin B12 deficiency to report between appointments  Follow-up 6 months or earlier as needed    I have answered all his questions to his satisfaction.     I have reviewed labs results, imaging, vitals, and medications with the patient today.       Patient verbalized understanding and is in agreement of the above plan.        Electronically signed by Zenia Camara MD, 03/25/25, 4:11 PM EDT.

## 2025-04-22 ENCOUNTER — HOSPITAL ENCOUNTER (OUTPATIENT)
Dept: ONCOLOGY | Facility: HOSPITAL | Age: 80
Discharge: HOME | End: 2025-04-22
Admitting: INTERNAL MEDICINE
Payer: MEDICARE

## 2025-04-22 DIAGNOSIS — E53.8 B12 DEFICIENCY: Primary | ICD-10-CM

## 2025-04-22 PROCEDURE — 25010000002 CYANOCOBALAMIN PER 1000 MCG: Performed by: INTERNAL MEDICINE

## 2025-04-22 PROCEDURE — 96372 THER/PROPH/DIAG INJ SC/IM: CPT

## 2025-04-22 RX ORDER — CYANOCOBALAMIN 1000 UG/ML
1000 INJECTION, SOLUTION INTRAMUSCULAR; SUBCUTANEOUS
Status: DISCONTINUED | OUTPATIENT
Start: 2025-04-22 | End: 2025-04-23 | Stop reason: HOSPADM

## 2025-04-22 RX ADMIN — CYANOCOBALAMIN 1000 MCG: 1000 INJECTION INTRAMUSCULAR; SUBCUTANEOUS at 09:09

## 2025-05-20 ENCOUNTER — HOSPITAL ENCOUNTER (OUTPATIENT)
Dept: ONCOLOGY | Facility: HOSPITAL | Age: 80
Discharge: HOME | End: 2025-05-20
Admitting: INTERNAL MEDICINE
Payer: MEDICARE

## 2025-05-20 DIAGNOSIS — E53.8 B12 DEFICIENCY: Primary | ICD-10-CM

## 2025-05-20 PROCEDURE — 96372 THER/PROPH/DIAG INJ SC/IM: CPT

## 2025-05-20 PROCEDURE — 25010000002 CYANOCOBALAMIN PER 1000 MCG: Performed by: INTERNAL MEDICINE

## 2025-05-20 RX ORDER — CYANOCOBALAMIN 1000 UG/ML
1000 INJECTION, SOLUTION INTRAMUSCULAR; SUBCUTANEOUS
Status: DISCONTINUED | OUTPATIENT
Start: 2025-05-20 | End: 2025-05-21 | Stop reason: HOSPADM

## 2025-05-20 RX ADMIN — CYANOCOBALAMIN 1000 MCG: 1000 INJECTION, SOLUTION INTRAMUSCULAR; SUBCUTANEOUS at 09:10

## 2025-06-17 ENCOUNTER — HOSPITAL ENCOUNTER (OUTPATIENT)
Dept: ONCOLOGY | Facility: HOSPITAL | Age: 80
Discharge: HOME | End: 2025-06-17
Admitting: INTERNAL MEDICINE
Payer: MEDICARE

## 2025-06-17 DIAGNOSIS — E53.8 B12 DEFICIENCY: Primary | ICD-10-CM

## 2025-06-17 PROCEDURE — 96372 THER/PROPH/DIAG INJ SC/IM: CPT

## 2025-06-17 PROCEDURE — 25010000002 CYANOCOBALAMIN PER 1000 MCG: Performed by: INTERNAL MEDICINE

## 2025-06-17 RX ORDER — CYANOCOBALAMIN 1000 UG/ML
1000 INJECTION, SOLUTION INTRAMUSCULAR; SUBCUTANEOUS
Status: DISCONTINUED | OUTPATIENT
Start: 2025-06-17 | End: 2025-06-18 | Stop reason: HOSPADM

## 2025-06-17 RX ADMIN — CYANOCOBALAMIN 1000 MCG: 1000 INJECTION, SOLUTION INTRAMUSCULAR at 09:18

## 2025-07-15 ENCOUNTER — HOSPITAL ENCOUNTER (OUTPATIENT)
Dept: ONCOLOGY | Facility: HOSPITAL | Age: 80
Discharge: HOME | End: 2025-07-15
Admitting: INTERNAL MEDICINE
Payer: MEDICARE

## 2025-07-15 DIAGNOSIS — E53.8 B12 DEFICIENCY: Primary | ICD-10-CM

## 2025-07-15 PROCEDURE — 96372 THER/PROPH/DIAG INJ SC/IM: CPT

## 2025-07-15 PROCEDURE — 25010000002 CYANOCOBALAMIN PER 1000 MCG: Performed by: INTERNAL MEDICINE

## 2025-07-15 RX ORDER — CYANOCOBALAMIN 1000 UG/ML
1000 INJECTION, SOLUTION INTRAMUSCULAR; SUBCUTANEOUS
Status: DISCONTINUED | OUTPATIENT
Start: 2025-07-15 | End: 2025-07-16 | Stop reason: HOSPADM

## 2025-07-15 RX ADMIN — CYANOCOBALAMIN 1000 MCG: 1000 INJECTION, SOLUTION INTRAMUSCULAR at 09:10

## 2025-08-12 ENCOUNTER — HOSPITAL ENCOUNTER (OUTPATIENT)
Dept: ONCOLOGY | Facility: HOSPITAL | Age: 80
Discharge: HOME | End: 2025-08-12
Admitting: INTERNAL MEDICINE
Payer: MEDICARE

## 2025-08-12 DIAGNOSIS — E53.8 B12 DEFICIENCY: Primary | ICD-10-CM

## 2025-08-12 PROCEDURE — 96372 THER/PROPH/DIAG INJ SC/IM: CPT

## 2025-08-12 PROCEDURE — 25010000002 CYANOCOBALAMIN PER 1000 MCG: Performed by: INTERNAL MEDICINE

## 2025-08-12 RX ORDER — CYANOCOBALAMIN 1000 UG/ML
1000 INJECTION, SOLUTION INTRAMUSCULAR; SUBCUTANEOUS
Status: DISCONTINUED | OUTPATIENT
Start: 2025-08-12 | End: 2025-08-13 | Stop reason: HOSPADM

## 2025-08-12 RX ADMIN — CYANOCOBALAMIN 1000 MCG: 1000 INJECTION, SOLUTION INTRAMUSCULAR at 09:08
